# Patient Record
Sex: FEMALE | Race: WHITE | NOT HISPANIC OR LATINO
[De-identification: names, ages, dates, MRNs, and addresses within clinical notes are randomized per-mention and may not be internally consistent; named-entity substitution may affect disease eponyms.]

---

## 2020-09-24 ENCOUNTER — APPOINTMENT (OUTPATIENT)
Dept: GASTROENTEROLOGY | Facility: CLINIC | Age: 75
End: 2020-09-24

## 2021-04-26 ENCOUNTER — APPOINTMENT (OUTPATIENT)
Dept: FAMILY MEDICINE | Facility: CLINIC | Age: 76
End: 2021-04-26
Payer: MEDICARE

## 2021-04-26 PROCEDURE — 99203 OFFICE O/P NEW LOW 30 MIN: CPT

## 2021-04-27 VITALS
SYSTOLIC BLOOD PRESSURE: 130 MMHG | HEIGHT: 60 IN | HEART RATE: 70 BPM | DIASTOLIC BLOOD PRESSURE: 74 MMHG | TEMPERATURE: 97.2 F | WEIGHT: 90 LBS | BODY MASS INDEX: 17.67 KG/M2 | OXYGEN SATURATION: 97 % | RESPIRATION RATE: 18 BRPM

## 2021-04-27 NOTE — REVIEW OF SYSTEMS
[Abdominal Pain] : abdominal pain [Constipation] : constipation [Heartburn] : heartburn [Joint Pain] : joint pain [Joint Stiffness] : joint stiffness [Joint Swelling] : joint swelling [Back Pain] : back pain [Negative] : Heme/Lymph [Chest Pain] : no chest pain [Shortness Of Breath] : no shortness of breath [Nausea] : no nausea [Diarrhea] : diarrhea [Vomiting] : no vomiting [Melena] : no melena [Muscle Weakness] : no muscle weakness [Muscle Pain] : no muscle pain [FreeTextEntry7] : epigastric tenderness, RLQ "discomfort" [FreeTextEntry9] : bilateral hands, worse in the morning, chronic

## 2021-04-27 NOTE — PHYSICAL EXAM
[Kyphosis] : kyphosis [Scoliosis] : scoliosis [Grossly Normal Strength/Tone] : grossly normal strength/tone [Coordination Grossly Intact] : coordination grossly intact [No Focal Deficits] : no focal deficits [Normal Gait] : normal gait [Speech Grossly Normal] : speech grossly normal [Alert and Oriented x3] : oriented to person, place, and time [Normal Mood] : the mood was normal [Normal] : affect was normal and insight and judgment were intact [de-identified] : bilateral joint swelling to fingers

## 2021-04-27 NOTE — HISTORY OF PRESENT ILLNESS
[FreeTextEntry8] : 74 yo female presents to the office to establish care and for RLQ discomfort since september. the patient reports that the "discomfort" is usually worse when she is sitting up, or bending at the waist. she reports usually having difficulty with her bowels, which is normal for her. her last FIT-DNA test was 2015, which was normal, and has never had a visual colonoscopy. She also reports intermittent GERD symptoms, which she also has a history of, which is intermittent, depending on what she ate that day. She states that in the past, if her IBS and GERD become very bad, she usually drinks only liquids for a couple of days, and then slowly starts to reincorporate solid foods that she can tolerate, until she feels better. she denies any chest pain, shortness of breath, or urinary symptoms. she also reports chronic back pain, due to her scoliosis and kyphosis. in addition, she reports bilateral hand stiffness and soreness, and some knuckle swelling, but denies any other joint pain in her body.

## 2021-05-10 ENCOUNTER — NON-APPOINTMENT (OUTPATIENT)
Age: 76
End: 2021-05-10

## 2021-05-10 DIAGNOSIS — R31.29 OTHER MICROSCOPIC HEMATURIA: ICD-10-CM

## 2021-05-28 ENCOUNTER — OUTPATIENT (OUTPATIENT)
Dept: OUTPATIENT SERVICES | Facility: HOSPITAL | Age: 76
LOS: 1 days | End: 2021-05-28
Payer: MEDICARE

## 2021-05-28 ENCOUNTER — APPOINTMENT (OUTPATIENT)
Dept: ULTRASOUND IMAGING | Facility: IMAGING CENTER | Age: 76
End: 2021-05-28
Payer: MEDICARE

## 2021-05-28 DIAGNOSIS — R10.31 RIGHT LOWER QUADRANT PAIN: ICD-10-CM

## 2021-05-28 DIAGNOSIS — K58.9 IRRITABLE BOWEL SYNDROME WITHOUT DIARRHEA: ICD-10-CM

## 2021-05-28 DIAGNOSIS — Z98.89 OTHER SPECIFIED POSTPROCEDURAL STATES: Chronic | ICD-10-CM

## 2021-05-28 PROCEDURE — 76700 US EXAM ABDOM COMPLETE: CPT

## 2021-05-28 PROCEDURE — 76700 US EXAM ABDOM COMPLETE: CPT | Mod: 26

## 2021-06-10 ENCOUNTER — OUTPATIENT (OUTPATIENT)
Dept: OUTPATIENT SERVICES | Facility: HOSPITAL | Age: 76
LOS: 1 days | End: 2021-06-10
Payer: MEDICARE

## 2021-06-10 ENCOUNTER — APPOINTMENT (OUTPATIENT)
Dept: MRI IMAGING | Facility: IMAGING CENTER | Age: 76
End: 2021-06-10
Payer: MEDICARE

## 2021-06-10 DIAGNOSIS — K82.8 OTHER SPECIFIED DISEASES OF GALLBLADDER: ICD-10-CM

## 2021-06-10 DIAGNOSIS — Z98.89 OTHER SPECIFIED POSTPROCEDURAL STATES: Chronic | ICD-10-CM

## 2021-06-10 PROCEDURE — 74183 MRI ABD W/O CNTR FLWD CNTR: CPT

## 2021-06-10 PROCEDURE — A9585: CPT

## 2021-06-10 PROCEDURE — 74183 MRI ABD W/O CNTR FLWD CNTR: CPT | Mod: 26,MH

## 2021-06-16 ENCOUNTER — NON-APPOINTMENT (OUTPATIENT)
Age: 76
End: 2021-06-16

## 2021-07-01 ENCOUNTER — RESULT REVIEW (OUTPATIENT)
Age: 76
End: 2021-07-01

## 2021-07-01 ENCOUNTER — APPOINTMENT (OUTPATIENT)
Dept: GASTROENTEROLOGY | Facility: CLINIC | Age: 76
End: 2021-07-01
Payer: MEDICARE

## 2021-07-01 VITALS
DIASTOLIC BLOOD PRESSURE: 80 MMHG | HEART RATE: 75 BPM | TEMPERATURE: 98.2 F | WEIGHT: 85 LBS | BODY MASS INDEX: 16.69 KG/M2 | HEIGHT: 60 IN | OXYGEN SATURATION: 98 % | SYSTOLIC BLOOD PRESSURE: 128 MMHG

## 2021-07-01 PROCEDURE — 99204 OFFICE O/P NEW MOD 45 MIN: CPT

## 2021-07-01 RX ORDER — FAMOTIDINE 10 MG/1
10 TABLET, FILM COATED ORAL DAILY
Qty: 30 | Refills: 1 | Status: ACTIVE | COMMUNITY
Start: 2021-07-01 | End: 1900-01-01

## 2021-07-01 NOTE — CONSULT LETTER
[Dear  ___] : Dear ~HALINA, [Consult Letter:] : I had the pleasure of evaluating your patient, [unfilled]. [Please see my note below.] : Please see my note below. [Consult Closing:] : Thank you very much for allowing me to participate in the care of this patient.  If you have any questions, please do not hesitate to contact me. [Sincerely,] : Sincerely, [FreeTextEntry3] : Himanshu James MD, MPH, FASGE\par Chief of Clinical Quality in Gastroenterology, [facility]\par Associate Chief of Gastroenterology, [facility]\par Director of Endoscopic Ultrasound, [facility]\par [address], [address]\par [place], [address]\par 24 hours [phone]\par \par

## 2021-07-01 NOTE — PHYSICAL EXAM
[General Appearance - Alert] : alert [General Appearance - In No Acute Distress] : in no acute distress [Sclera] : the sclera and conjunctiva were normal [Auscultation Breath Sounds / Voice Sounds] : lungs were clear to auscultation bilaterally [Heart Rate And Rhythm] : heart rate was normal and rhythm regular [Bowel Sounds] : normal bowel sounds [Abdomen Soft] : soft [] : no hepato-splenomegaly [Abdomen Mass (___ Cm)] : no abdominal mass palpated [RUQ] : in the right upper quadrant [RLQ] : in the right lower quadrant [Abnormal Walk] : normal gait [FreeTextEntry1] : No confusion [Affect] : the affect was normal

## 2021-07-01 NOTE — ASSESSMENT
[FreeTextEntry1] : Impression:\par \par Gallbladder mass with mild RUQ tenderness\par RLQ abd pain\par Heartburn, subacute\par Low weight (85 lb) (was told by GI in 2000's that she had to eat bland food)\par Spasmodic dysphonia \par \par Recommendations:\par \par CT scan abd/pelvis\par Benefiber\par Pepcid\par Eventual referral to surgery\par Followup in 3 months.\par

## 2021-07-01 NOTE — HISTORY OF PRESENT ILLNESS
[FreeTextEntry1] : Patient referred for RLQ abdominal gas/stretching type pain for 2-3 months\par Intermittent\par \par Not affected by bowel movements\par May be affected by food\par No constipation/diarrhea/blood in stool\par No fever, chills, sweats, weight loss.\par \par Heartburn daily x few weeks\par Takes no meds.\par No dysphagia / odynophagia\par \par Labs reviewed from 4/2821\par Normal CBC/LFTs\par Normal Cr 0.85\par \par Ultrasound abdomen 5/28/21:\par 1.3 x 1 cm soft tissue mass in gallbladder fundus\par \par MRI abdomen 6/10/21\par 1.5 x 1.3 cm nodule adjacent to gallbladder fundus with T2 hypointensity and mild peripheral enhancement.\par Indeterminant, differential diagnosis:  adenomyomatosis or neoplasm\par \par \par

## 2021-07-08 ENCOUNTER — OUTPATIENT (OUTPATIENT)
Dept: OUTPATIENT SERVICES | Facility: HOSPITAL | Age: 76
LOS: 1 days | End: 2021-07-08
Payer: MEDICARE

## 2021-07-08 ENCOUNTER — APPOINTMENT (OUTPATIENT)
Dept: CT IMAGING | Facility: IMAGING CENTER | Age: 76
End: 2021-07-08
Payer: MEDICARE

## 2021-07-08 DIAGNOSIS — R10.31 RIGHT LOWER QUADRANT PAIN: ICD-10-CM

## 2021-07-08 DIAGNOSIS — Z98.89 OTHER SPECIFIED POSTPROCEDURAL STATES: Chronic | ICD-10-CM

## 2021-07-08 PROCEDURE — G1004: CPT

## 2021-07-08 PROCEDURE — 74177 CT ABD & PELVIS W/CONTRAST: CPT | Mod: 26,ME

## 2021-07-08 PROCEDURE — 74177 CT ABD & PELVIS W/CONTRAST: CPT

## 2021-07-20 ENCOUNTER — TRANSCRIPTION ENCOUNTER (OUTPATIENT)
Age: 76
End: 2021-07-20

## 2021-08-03 ENCOUNTER — APPOINTMENT (OUTPATIENT)
Dept: SURGICAL ONCOLOGY | Facility: CLINIC | Age: 76
End: 2021-08-03
Payer: MEDICARE

## 2021-08-03 VITALS
DIASTOLIC BLOOD PRESSURE: 93 MMHG | BODY MASS INDEX: 16.49 KG/M2 | OXYGEN SATURATION: 98 % | WEIGHT: 84 LBS | HEIGHT: 60 IN | HEART RATE: 74 BPM | RESPIRATION RATE: 18 BRPM | SYSTOLIC BLOOD PRESSURE: 149 MMHG

## 2021-08-03 PROCEDURE — 99203 OFFICE O/P NEW LOW 30 MIN: CPT

## 2021-08-09 ENCOUNTER — OUTPATIENT (OUTPATIENT)
Dept: OUTPATIENT SERVICES | Facility: HOSPITAL | Age: 76
LOS: 1 days | End: 2021-08-09
Payer: MEDICARE

## 2021-08-09 ENCOUNTER — APPOINTMENT (OUTPATIENT)
Dept: FAMILY MEDICINE | Facility: CLINIC | Age: 76
End: 2021-08-09

## 2021-08-09 VITALS
WEIGHT: 84 LBS | SYSTOLIC BLOOD PRESSURE: 118 MMHG | OXYGEN SATURATION: 98 % | RESPIRATION RATE: 16 BRPM | HEART RATE: 78 BPM | HEIGHT: 59 IN | TEMPERATURE: 98 F | DIASTOLIC BLOOD PRESSURE: 80 MMHG

## 2021-08-09 DIAGNOSIS — R94.31 ABNORMAL ELECTROCARDIOGRAM [ECG] [EKG]: ICD-10-CM

## 2021-08-09 DIAGNOSIS — Z98.89 OTHER SPECIFIED POSTPROCEDURAL STATES: Chronic | ICD-10-CM

## 2021-08-09 DIAGNOSIS — Z90.79 ACQUIRED ABSENCE OF OTHER GENITAL ORGAN(S): Chronic | ICD-10-CM

## 2021-08-09 DIAGNOSIS — Z87.81 PERSONAL HISTORY OF (HEALED) TRAUMATIC FRACTURE: Chronic | ICD-10-CM

## 2021-08-09 DIAGNOSIS — K82.8 OTHER SPECIFIED DISEASES OF GALLBLADDER: ICD-10-CM

## 2021-08-09 DIAGNOSIS — Z91.013 ALLERGY TO SEAFOOD: ICD-10-CM

## 2021-08-09 DIAGNOSIS — Z98.890 OTHER SPECIFIED POSTPROCEDURAL STATES: Chronic | ICD-10-CM

## 2021-08-09 LAB
ALBUMIN SERPL ELPH-MCNC: 4.6 G/DL — SIGNIFICANT CHANGE UP (ref 3.3–5)
ALP SERPL-CCNC: 117 U/L — SIGNIFICANT CHANGE UP (ref 40–120)
ALT FLD-CCNC: 20 U/L — SIGNIFICANT CHANGE UP (ref 4–33)
ANION GAP SERPL CALC-SCNC: 14 MMOL/L — SIGNIFICANT CHANGE UP (ref 7–14)
APTT BLD: 31.6 SEC — SIGNIFICANT CHANGE UP (ref 27–36.3)
AST SERPL-CCNC: 28 U/L — SIGNIFICANT CHANGE UP (ref 4–32)
BILIRUB SERPL-MCNC: 0.4 MG/DL — SIGNIFICANT CHANGE UP (ref 0.2–1.2)
BLD GP AB SCN SERPL QL: NEGATIVE — SIGNIFICANT CHANGE UP
BUN SERPL-MCNC: 23 MG/DL — SIGNIFICANT CHANGE UP (ref 7–23)
CALCIUM SERPL-MCNC: 10.2 MG/DL — SIGNIFICANT CHANGE UP (ref 8.4–10.5)
CHLORIDE SERPL-SCNC: 102 MMOL/L — SIGNIFICANT CHANGE UP (ref 98–107)
CO2 SERPL-SCNC: 24 MMOL/L — SIGNIFICANT CHANGE UP (ref 22–31)
CREAT SERPL-MCNC: 0.85 MG/DL — SIGNIFICANT CHANGE UP (ref 0.5–1.3)
GLUCOSE SERPL-MCNC: 108 MG/DL — HIGH (ref 70–99)
HCT VFR BLD CALC: 43.3 % — SIGNIFICANT CHANGE UP (ref 34.5–45)
HGB BLD-MCNC: 13.9 G/DL — SIGNIFICANT CHANGE UP (ref 11.5–15.5)
INR BLD: 1.02 RATIO — SIGNIFICANT CHANGE UP (ref 0.88–1.16)
MCHC RBC-ENTMCNC: 29.3 PG — SIGNIFICANT CHANGE UP (ref 27–34)
MCHC RBC-ENTMCNC: 32.1 GM/DL — SIGNIFICANT CHANGE UP (ref 32–36)
MCV RBC AUTO: 91.4 FL — SIGNIFICANT CHANGE UP (ref 80–100)
NRBC # BLD: 0 /100 WBCS — SIGNIFICANT CHANGE UP
NRBC # FLD: 0 K/UL — SIGNIFICANT CHANGE UP
PLATELET # BLD AUTO: 191 K/UL — SIGNIFICANT CHANGE UP (ref 150–400)
POTASSIUM SERPL-MCNC: 3.6 MMOL/L — SIGNIFICANT CHANGE UP (ref 3.5–5.3)
POTASSIUM SERPL-SCNC: 3.6 MMOL/L — SIGNIFICANT CHANGE UP (ref 3.5–5.3)
PROT SERPL-MCNC: 7.6 G/DL — SIGNIFICANT CHANGE UP (ref 6–8.3)
PROTHROM AB SERPL-ACNC: 11.6 SEC — SIGNIFICANT CHANGE UP (ref 10.6–13.6)
RBC # BLD: 4.74 M/UL — SIGNIFICANT CHANGE UP (ref 3.8–5.2)
RBC # FLD: 13 % — SIGNIFICANT CHANGE UP (ref 10.3–14.5)
RH IG SCN BLD-IMP: POSITIVE — SIGNIFICANT CHANGE UP
SODIUM SERPL-SCNC: 140 MMOL/L — SIGNIFICANT CHANGE UP (ref 135–145)
WBC # BLD: 6.72 K/UL — SIGNIFICANT CHANGE UP (ref 3.8–10.5)
WBC # FLD AUTO: 6.72 K/UL — SIGNIFICANT CHANGE UP (ref 3.8–10.5)

## 2021-08-09 PROCEDURE — 93010 ELECTROCARDIOGRAM REPORT: CPT

## 2021-08-09 RX ORDER — SODIUM CHLORIDE 9 MG/ML
3 INJECTION INTRAMUSCULAR; INTRAVENOUS; SUBCUTANEOUS EVERY 8 HOURS
Refills: 0 | Status: DISCONTINUED | OUTPATIENT
Start: 2021-08-20 | End: 2021-08-22

## 2021-08-09 RX ORDER — SODIUM CHLORIDE 9 MG/ML
1000 INJECTION, SOLUTION INTRAVENOUS
Refills: 0 | Status: DISCONTINUED | OUTPATIENT
Start: 2021-08-20 | End: 2021-08-22

## 2021-08-09 NOTE — H&P PST ADULT - NEGATIVE GENERAL GENITOURINARY SYMPTOMS
no hematuria/no renal colic/no flank pain L/no flank pain R/no urine discoloration/no bladder infections/no dysuria

## 2021-08-09 NOTE — H&P PST ADULT - HISTORY OF PRESENT ILLNESS
stretching, swelling occasional RLQ prickly pain 04/2021. S/P US which revealed a mass  76 y/o female presents to PST with c/o "stretching, swelling occasional RLQ prickly pain 04/2021". S/P US (05.2021)/ MRI (06/2021) which revealed "a mass". Scheduled for robotic cholecystectomy possible liver resection possible open on 08/20/2021

## 2021-08-09 NOTE — H&P PST ADULT - NSICDXPROBLEM_GEN_ALL_CORE_FT
PROBLEM DIAGNOSES  Problem: Other specified diseases of gallbladder  Assessment and Plan: Scheduled for robotic cholecystectomy possible liver resection possible open on 08/20/2021. Pre op instructions, famotidine, chlorhexidine gluconate soap given and explained. Pt verbalized understanding.   Pt instructed to contact surgeon's office regarding Covid 19 test pre op. Pt verbalized understanding.     Problem: Shellfish allergy  Assessment and Plan: H/O shellfish allergy       PROBLEM DIAGNOSES  Problem: Other specified diseases of gallbladder  Assessment and Plan: Scheduled for robotic cholecystectomy possible liver resection possible open on 08/20/2021. Pre op instructions, famotidine, chlorhexidine gluconate soap given and explained. Pt verbalized understanding.   Pt instructed to contact surgeon's office regarding Covid 19 test pre op. Pt verbalized understanding.     Problem: Shellfish allergy  Assessment and Plan: H/O shellfish allergy    Problem: Abnormal EKG  Assessment and Plan: Pt instructed to see [facility]P for further consultation. Pt stated that she has scheduled appt with PCP on 08/13/2021  Pending medical consultation pre op

## 2021-08-09 NOTE — H&P PST ADULT - NEGATIVE OPHTHALMOLOGIC SYMPTOMS
no diplopia/no photophobia/no lacrimation L/no lacrimation R/no blurred vision L/no discharge L/no discharge R/no pain L/no irritation L/no irritation R/no loss of vision L/no loss of vision R

## 2021-08-09 NOTE — H&P PST ADULT - GASTROINTESTINAL DETAILS
soft/nontender/no distention/no masses palpable/bowel sounds normal soft/nontender/no distention/bowel sounds normal

## 2021-08-09 NOTE — H&P PST ADULT - NEGATIVE ENMT SYMPTOMS
no hearing difficulty/no ear pain/no tinnitus/no vertigo/no post-nasal discharge/no abnormal taste sensation

## 2021-08-09 NOTE — H&P PST ADULT - NSICDXPASTSURGICALHX_GEN_ALL_CORE_FT
PAST SURGICAL HISTORY:  History of appendectomy      PAST SURGICAL HISTORY:  H/O removal of cyst left axilla    H/O unilateral salpingectomy right    History of appendectomy left; repair    History of fracture of humerus

## 2021-08-09 NOTE — H&P PST ADULT - NSICDXPASTMEDICALHX_GEN_ALL_CORE_FT
PAST MEDICAL HISTORY:  Arthritis      PAST MEDICAL HISTORY:  Acid reflux     Arthritis     Spasmodic dysphonia      PAST MEDICAL HISTORY:  Acid reflux     Arthritis     Hiatal hernia     IBS (irritable bowel syndrome)     Scoliosis     Spasmodic dysphonia      PAST MEDICAL HISTORY:  Acid reflux     Arthritis     Hiatal hernia     IBS (irritable bowel syndrome)     MVP (mitral valve prolapse)     Scoliosis     Spasmodic dysphonia

## 2021-08-13 ENCOUNTER — APPOINTMENT (OUTPATIENT)
Dept: FAMILY MEDICINE | Facility: CLINIC | Age: 76
End: 2021-08-13
Payer: MEDICARE

## 2021-08-13 ENCOUNTER — NON-APPOINTMENT (OUTPATIENT)
Age: 76
End: 2021-08-13

## 2021-08-13 VITALS
WEIGHT: 84 LBS | TEMPERATURE: 99.8 F | DIASTOLIC BLOOD PRESSURE: 72 MMHG | BODY MASS INDEX: 14.88 KG/M2 | SYSTOLIC BLOOD PRESSURE: 140 MMHG | RESPIRATION RATE: 18 BRPM | HEIGHT: 63 IN | HEART RATE: 73 BPM | OXYGEN SATURATION: 97 %

## 2021-08-13 DIAGNOSIS — Z01.818 ENCOUNTER FOR OTHER PREPROCEDURAL EXAMINATION: ICD-10-CM

## 2021-08-13 PROCEDURE — 99215 OFFICE O/P EST HI 40 MIN: CPT | Mod: 25

## 2021-08-13 PROCEDURE — 93000 ELECTROCARDIOGRAM COMPLETE: CPT

## 2021-08-16 DIAGNOSIS — Z01.818 ENCOUNTER FOR OTHER PREPROCEDURAL EXAMINATION: ICD-10-CM

## 2021-08-17 ENCOUNTER — APPOINTMENT (OUTPATIENT)
Dept: DISASTER EMERGENCY | Facility: CLINIC | Age: 76
End: 2021-08-17

## 2021-08-18 PROBLEM — Z01.818 PRE-OPERATIVE EXAMINATION: Status: ACTIVE | Noted: 2021-08-13

## 2021-08-18 LAB — SARS-COV-2 N GENE NPH QL NAA+PROBE: NOT DETECTED

## 2021-08-18 NOTE — ASSESSMENT
[Patient Optimized for Surgery] : Patient optimized for surgery [No Further Testing Recommended] : no further testing recommended [As per surgery] : as per surgery [FreeTextEntry4] : Patient optimized for surgery\par - Pending COVID PCR nasal swab results [FreeTextEntry2] : Patient instructed to d/c all OTC medications/supplements one week prior to surgery

## 2021-08-18 NOTE — PHYSICAL EXAM
[Declined Breast Exam] : declined breast exam  [Declined Rectal Exam] : declined rectal exam [Normal Supraclavicular Nodes] : no supraclavicular lymphadenopathy [No CVA Tenderness] : no CVA  tenderness [No Spinal Tenderness] : no spinal tenderness [Kyphosis] : kyphosis [Scoliosis] : scoliosis [Coordination Grossly Intact] : coordination grossly intact [No Focal Deficits] : no focal deficits [Normal Gait] : normal gait [Speech Grossly Normal] : speech grossly normal [Alert and Oriented x3] : oriented to person, place, and time [Normal Mood] : the mood was normal [Normal] : affect was normal and insight and judgment were intact

## 2021-08-18 NOTE — HISTORY OF PRESENT ILLNESS
[No Pertinent Cardiac History] : no history of aortic stenosis, atrial fibrillation, coronary artery disease, recent myocardial infarction, or implantable device/pacemaker [No Pertinent Pulmonary History] : no history of asthma, COPD, sleep apnea, or smoking [No Adverse Anesthesia Reaction] : no adverse anesthesia reaction in self or family member [(Patient denies any chest pain, claudication, dyspnea on exertion, orthopnea, palpitations or syncope)] : Patient denies any chest pain, claudication, dyspnea on exertion, orthopnea, palpitations or syncope [Chronic Anticoagulation] : no chronic anticoagulation [Chronic Kidney Disease] : no chronic kidney disease [Diabetes] : no diabetes [FreeTextEntry1] : Cholecystectomy for gall bladder mass, possible liver resection [FreeTextEntry2] : 08/20/21 [FreeTextEntry3] : MD Crow Best [FreeTextEntry4] : 74 yo female presents to the office for pre operative clearance for a cholecystectomy for a gall bladder mass. [FreeTextEntry7] : EKG done: sinus rhythm @ 67 bpm, no acute changes

## 2021-08-18 NOTE — REVIEW OF SYSTEMS
[Joint Pain] : joint pain [Back Pain] : back pain [Negative] : Heme/Lymph [Nausea] : no nausea [Constipation] : no constipation [Diarrhea] : diarrhea [Vomiting] : no vomiting [Heartburn] : no heartburn [Melena] : no melena [Joint Stiffness] : no joint stiffness [Joint Swelling] : no joint swelling [Muscle Weakness] : no muscle weakness [Muscle Pain] : no muscle pain [FreeTextEntry7] : 'abdominal discomfort ruq' [FreeTextEntry9] : chronic

## 2021-08-19 ENCOUNTER — TRANSCRIPTION ENCOUNTER (OUTPATIENT)
Age: 76
End: 2021-08-19

## 2021-08-19 NOTE — REASON FOR VISIT
[Initial Consultation] : an initial consultation for [Referred By: ___] : Referred By: [unfilled] [FreeTextEntry2] : gallbladder mass

## 2021-08-19 NOTE — HISTORY OF PRESENT ILLNESS
[de-identified] : Ms. Delgado is a 74 y/o female who presented with 2-3 month history of RUQ abdominal pain with associated acid reflux.\par She denies fever/chills, nausea/emesis or changes in bowel habits.\par Abdominal ultrasound 05/28/2021 demonstrated a 1.3 x 1 cm soft tissue mass in the gallbladder fundus.\par MRI abdomen 06/10/2021 confirmed 1.5 x 1.3 cm nodule adjacent to the gallbladder fundus.\par CT abdomen/pelvis 07/08/2021 showed a difficult to delineate soft tissue density adjacent to the gallbladder measuring about 1 cm.\par She is referred for evaluation and treatment.

## 2021-08-19 NOTE — ASSESSMENT
[FreeTextEntry1] : 74 y/o female with gallbladder mass or liver mass adjacent to gallbladder.\par Malignancy is not excluded.\par Recommend minimally invasive surgical exploration with expected cholecystectomy possible liver resection.\par All questions answered; risks/benefits explained.  Patient expresses understanding and wishes to proceed.

## 2021-08-20 ENCOUNTER — APPOINTMENT (OUTPATIENT)
Dept: SURGICAL ONCOLOGY | Facility: HOSPITAL | Age: 76
End: 2021-08-20

## 2021-08-20 ENCOUNTER — INPATIENT (INPATIENT)
Facility: HOSPITAL | Age: 76
LOS: 2 days | Discharge: HOME | End: 2021-08-23
Attending: SURGERY | Admitting: SURGERY
Payer: MEDICARE

## 2021-08-20 ENCOUNTER — RESULT REVIEW (OUTPATIENT)
Age: 76
End: 2021-08-20

## 2021-08-20 VITALS
OXYGEN SATURATION: 100 % | HEIGHT: 59 IN | WEIGHT: 84 LBS | SYSTOLIC BLOOD PRESSURE: 143 MMHG | DIASTOLIC BLOOD PRESSURE: 77 MMHG | RESPIRATION RATE: 16 BRPM | HEART RATE: 71 BPM | TEMPERATURE: 99 F

## 2021-08-20 DIAGNOSIS — Z98.890 OTHER SPECIFIED POSTPROCEDURAL STATES: Chronic | ICD-10-CM

## 2021-08-20 DIAGNOSIS — Z98.89 OTHER SPECIFIED POSTPROCEDURAL STATES: Chronic | ICD-10-CM

## 2021-08-20 DIAGNOSIS — K82.8 OTHER SPECIFIED DISEASES OF GALLBLADDER: ICD-10-CM

## 2021-08-20 DIAGNOSIS — Z87.81 PERSONAL HISTORY OF (HEALED) TRAUMATIC FRACTURE: Chronic | ICD-10-CM

## 2021-08-20 DIAGNOSIS — Z90.79 ACQUIRED ABSENCE OF OTHER GENITAL ORGAN(S): Chronic | ICD-10-CM

## 2021-08-20 PROCEDURE — S2900 ROBOTIC SURGICAL SYSTEM: CPT | Mod: NC

## 2021-08-20 PROCEDURE — 47562 LAPAROSCOPIC CHOLECYSTECTOMY: CPT

## 2021-08-20 PROCEDURE — 88305 TISSUE EXAM BY PATHOLOGIST: CPT | Mod: 26

## 2021-08-20 PROCEDURE — 88304 TISSUE EXAM BY PATHOLOGIST: CPT | Mod: 26

## 2021-08-20 PROCEDURE — 44604 SUTURE LARGE INTESTINE: CPT | Mod: 59

## 2021-08-20 PROCEDURE — 49905 OMENTAL FLAP INTRA-ABDOM: CPT

## 2021-08-20 RX ORDER — ENOXAPARIN SODIUM 100 MG/ML
30 INJECTION SUBCUTANEOUS EVERY 24 HOURS
Refills: 0 | Status: DISCONTINUED | OUTPATIENT
Start: 2021-08-21 | End: 2021-08-23

## 2021-08-20 RX ORDER — HYDROMORPHONE HYDROCHLORIDE 2 MG/ML
0.25 INJECTION INTRAMUSCULAR; INTRAVENOUS; SUBCUTANEOUS
Refills: 0 | Status: DISCONTINUED | OUTPATIENT
Start: 2021-08-20 | End: 2021-08-20

## 2021-08-20 RX ORDER — FAMOTIDINE 10 MG/ML
20 INJECTION INTRAVENOUS
Refills: 0 | Status: DISCONTINUED | OUTPATIENT
Start: 2021-08-20 | End: 2021-08-23

## 2021-08-20 RX ORDER — ALUMINUM HYDROXIDE AND MAGNESIUM TRISILICATE 80; 14.2 MG/1; MG/1
2 TABLET, CHEWABLE ORAL
Qty: 0 | Refills: 0 | DISCHARGE

## 2021-08-20 RX ORDER — METRONIDAZOLE 7.5 MG/G
0 GEL VAGINAL
Qty: 0 | Refills: 3 | DISCHARGE

## 2021-08-20 RX ORDER — CHOLECALCIFEROL (VITAMIN D3) 125 MCG
1 CAPSULE ORAL
Qty: 0 | Refills: 0 | DISCHARGE

## 2021-08-20 RX ORDER — ONDANSETRON 8 MG/1
4 TABLET, FILM COATED ORAL ONCE
Refills: 0 | Status: COMPLETED | OUTPATIENT
Start: 2021-08-20 | End: 2021-08-20

## 2021-08-20 RX ORDER — CEFOTETAN DISODIUM 1 G
2 VIAL (EA) INJECTION EVERY 12 HOURS
Refills: 0 | Status: COMPLETED | OUTPATIENT
Start: 2021-08-20 | End: 2021-08-21

## 2021-08-20 RX ORDER — ACETAMINOPHEN 500 MG
500 TABLET ORAL EVERY 6 HOURS
Refills: 0 | Status: COMPLETED | OUTPATIENT
Start: 2021-08-20 | End: 2021-08-21

## 2021-08-20 RX ORDER — CHOLECALCIFEROL (VITAMIN D3) 125 MCG
1000 CAPSULE ORAL DAILY
Refills: 0 | Status: DISCONTINUED | OUTPATIENT
Start: 2021-08-20 | End: 2021-08-23

## 2021-08-20 RX ORDER — OXYCODONE HYDROCHLORIDE 5 MG/1
5 TABLET ORAL EVERY 4 HOURS
Refills: 0 | Status: DISCONTINUED | OUTPATIENT
Start: 2021-08-20 | End: 2021-08-23

## 2021-08-20 RX ORDER — OXYCODONE HYDROCHLORIDE 5 MG/1
2.5 TABLET ORAL EVERY 4 HOURS
Refills: 0 | Status: DISCONTINUED | OUTPATIENT
Start: 2021-08-20 | End: 2021-08-23

## 2021-08-20 RX ADMIN — Medication 200 MILLIGRAM(S): at 20:00

## 2021-08-20 RX ADMIN — Medication 500 MILLIGRAM(S): at 20:30

## 2021-08-20 RX ADMIN — ONDANSETRON 4 MILLIGRAM(S): 8 TABLET, FILM COATED ORAL at 16:30

## 2021-08-20 RX ADMIN — SODIUM CHLORIDE 3 MILLILITER(S): 9 INJECTION INTRAMUSCULAR; INTRAVENOUS; SUBCUTANEOUS at 21:44

## 2021-08-20 RX ADMIN — SODIUM CHLORIDE 50 MILLILITER(S): 9 INJECTION, SOLUTION INTRAVENOUS at 16:00

## 2021-08-20 RX ADMIN — Medication 100 GRAM(S): at 20:01

## 2021-08-20 NOTE — ASU PREOP CHECKLIST - AS BP NONINV SITE
normal appearance , without tenderness upon palpation , no deformities , trachea midline  left upper arm

## 2021-08-20 NOTE — BRIEF OPERATIVE NOTE - NSICDXBRIEFPROCEDURE_GEN_ALL_CORE_FT
PROCEDURES:  Robot-assisted laparoscopic cholecystectomy 20-Aug-2021 15:34:12  Sanket Best  Repair of colotomy 20-Aug-2021 15:34:23  Sanket Best

## 2021-08-20 NOTE — CHART NOTE - NSCHARTNOTEFT_GEN_A_CORE
Surgery Post-Op Note    Pre-Op Dx: Acute cholecystitis   Procedure: Robot-assisted laparoscopic cholecystectomy  Repair of colotomy  Surgeon: Dr. Best    SUBJECTIVE:  Pt seen and examined at the bedside. Pt w/ no complaints. Denies F/C/N/V. Pain controlled with medication.     OBJECTIVE:  Vital Signs Last 24 Hrs  T(C): 36.2 (20 Aug 2021 19:59), Max: 37.1 (20 Aug 2021 11:22)  T(F): 97.1 (20 Aug 2021 19:59), Max: 98.7 (20 Aug 2021 11:22)  HR: 66 (20 Aug 2021 19:59) (66 - 82)  BP: 118/66 (20 Aug 2021 19:59) (111/65 - 143/77)  BP(mean): 86 (20 Aug 2021 18:00) (76 - 91)  RR: 17 (20 Aug 2021 19:59) (13 - 19)  SpO2: 100% (20 Aug 2021 19:59) (97% - 100%)    Physical Exam:  General: NAD, resting comfortably in bed  Neuro: A/O x 3, no focal deficits  Pulmonary: Nonlabored breathing, no respiratory distress  Cardiovascular: NSR  Abdominal: soft, ATTP. mildly distended. Dressing C/D/I  Extremities: WWP    LABS:            CAPILLARY BLOOD GLUCOSE            ABO Interpretation: O (08-20 @ 11:42)      IMAGING:    ASSESSMENT:75y Female now 4hours s/p robotic assisted laparoscopic cholecystectomy with transverse colon repair.     PLAN:  - Pain control  - Encourage IS  - Nausea control PRN  - Monitor vitals  - Diet: IVF + NPO  - Monitor I+Os  - OOB/ Ambulate  - DVT ppx: lovenox    D Team Surgery  [id number]

## 2021-08-20 NOTE — BRIEF OPERATIVE NOTE - OPERATION/FINDINGS
Robotic assisted laparoscopic cholecystectomy. Gallbladder mass identified, cholecystectomy performed. Frozen section consistent with gallbladder adenomyosis. During abdominal entry the transverse colon was injured and subsequently repaired.

## 2021-08-21 LAB
ALBUMIN SERPL ELPH-MCNC: 3.3 G/DL — SIGNIFICANT CHANGE UP (ref 3.3–5)
ALP SERPL-CCNC: 85 U/L — SIGNIFICANT CHANGE UP (ref 40–120)
ALT FLD-CCNC: 13 U/L — SIGNIFICANT CHANGE UP (ref 4–33)
ANION GAP SERPL CALC-SCNC: 16 MMOL/L — HIGH (ref 7–14)
AST SERPL-CCNC: 25 U/L — SIGNIFICANT CHANGE UP (ref 4–32)
BILIRUB DIRECT SERPL-MCNC: <0.2 MG/DL — SIGNIFICANT CHANGE UP (ref 0–0.2)
BILIRUB INDIRECT FLD-MCNC: >0.3 MG/DL — SIGNIFICANT CHANGE UP (ref 0–1)
BILIRUB SERPL-MCNC: 0.5 MG/DL — SIGNIFICANT CHANGE UP (ref 0.2–1.2)
BUN SERPL-MCNC: 16 MG/DL — SIGNIFICANT CHANGE UP (ref 7–23)
CALCIUM SERPL-MCNC: 9.2 MG/DL — SIGNIFICANT CHANGE UP (ref 8.4–10.5)
CHLORIDE SERPL-SCNC: 102 MMOL/L — SIGNIFICANT CHANGE UP (ref 98–107)
CO2 SERPL-SCNC: 21 MMOL/L — LOW (ref 22–31)
COVID-19 SPIKE DOMAIN AB INTERP: POSITIVE
COVID-19 SPIKE DOMAIN ANTIBODY RESULT: >250 U/ML — HIGH
CREAT SERPL-MCNC: 0.86 MG/DL — SIGNIFICANT CHANGE UP (ref 0.5–1.3)
GLUCOSE SERPL-MCNC: 110 MG/DL — HIGH (ref 70–99)
HCT VFR BLD CALC: 36.5 % — SIGNIFICANT CHANGE UP (ref 34.5–45)
HGB BLD-MCNC: 11.9 G/DL — SIGNIFICANT CHANGE UP (ref 11.5–15.5)
MAGNESIUM SERPL-MCNC: 1.9 MG/DL — SIGNIFICANT CHANGE UP (ref 1.6–2.6)
MCHC RBC-ENTMCNC: 29.1 PG — SIGNIFICANT CHANGE UP (ref 27–34)
MCHC RBC-ENTMCNC: 32.6 GM/DL — SIGNIFICANT CHANGE UP (ref 32–36)
MCV RBC AUTO: 89.2 FL — SIGNIFICANT CHANGE UP (ref 80–100)
NRBC # BLD: 0 /100 WBCS — SIGNIFICANT CHANGE UP
NRBC # FLD: 0 K/UL — SIGNIFICANT CHANGE UP
PHOSPHATE SERPL-MCNC: 4.2 MG/DL — SIGNIFICANT CHANGE UP (ref 2.5–4.5)
PLATELET # BLD AUTO: 160 K/UL — SIGNIFICANT CHANGE UP (ref 150–400)
POTASSIUM SERPL-MCNC: 4.1 MMOL/L — SIGNIFICANT CHANGE UP (ref 3.5–5.3)
POTASSIUM SERPL-SCNC: 4.1 MMOL/L — SIGNIFICANT CHANGE UP (ref 3.5–5.3)
PROT SERPL-MCNC: 5.7 G/DL — LOW (ref 6–8.3)
RBC # BLD: 4.09 M/UL — SIGNIFICANT CHANGE UP (ref 3.8–5.2)
RBC # FLD: 13.2 % — SIGNIFICANT CHANGE UP (ref 10.3–14.5)
SARS-COV-2 IGG+IGM SERPL QL IA: >250 U/ML — HIGH
SARS-COV-2 IGG+IGM SERPL QL IA: POSITIVE
SODIUM SERPL-SCNC: 139 MMOL/L — SIGNIFICANT CHANGE UP (ref 135–145)
WBC # BLD: 14.1 K/UL — HIGH (ref 3.8–10.5)
WBC # FLD AUTO: 14.1 K/UL — HIGH (ref 3.8–10.5)

## 2021-08-21 RX ORDER — KETOROLAC TROMETHAMINE 30 MG/ML
15 SYRINGE (ML) INJECTION EVERY 12 HOURS
Refills: 0 | Status: DISCONTINUED | OUTPATIENT
Start: 2021-08-21 | End: 2021-08-23

## 2021-08-21 RX ORDER — KETOROLAC TROMETHAMINE 30 MG/ML
15 SYRINGE (ML) INJECTION ONCE
Refills: 0 | Status: DISCONTINUED | OUTPATIENT
Start: 2021-08-21 | End: 2021-08-21

## 2021-08-21 RX ORDER — MAGNESIUM SULFATE 500 MG/ML
2 VIAL (ML) INJECTION ONCE
Refills: 0 | Status: COMPLETED | OUTPATIENT
Start: 2021-08-21 | End: 2021-08-21

## 2021-08-21 RX ADMIN — Medication 500 MILLIGRAM(S): at 09:25

## 2021-08-21 RX ADMIN — SODIUM CHLORIDE 3 MILLILITER(S): 9 INJECTION INTRAMUSCULAR; INTRAVENOUS; SUBCUTANEOUS at 13:12

## 2021-08-21 RX ADMIN — SODIUM CHLORIDE 50 MILLILITER(S): 9 INJECTION, SOLUTION INTRAVENOUS at 05:21

## 2021-08-21 RX ADMIN — OXYCODONE HYDROCHLORIDE 5 MILLIGRAM(S): 5 TABLET ORAL at 21:11

## 2021-08-21 RX ADMIN — SODIUM CHLORIDE 50 MILLILITER(S): 9 INJECTION, SOLUTION INTRAVENOUS at 21:06

## 2021-08-21 RX ADMIN — Medication 100 GRAM(S): at 09:02

## 2021-08-21 RX ADMIN — Medication 500 MILLIGRAM(S): at 16:11

## 2021-08-21 RX ADMIN — Medication 15 MILLIGRAM(S): at 14:14

## 2021-08-21 RX ADMIN — Medication 15 MILLIGRAM(S): at 13:07

## 2021-08-21 RX ADMIN — OXYCODONE HYDROCHLORIDE 5 MILLIGRAM(S): 5 TABLET ORAL at 22:11

## 2021-08-21 RX ADMIN — Medication 200 MILLIGRAM(S): at 15:42

## 2021-08-21 RX ADMIN — SODIUM CHLORIDE 3 MILLILITER(S): 9 INJECTION INTRAMUSCULAR; INTRAVENOUS; SUBCUTANEOUS at 21:08

## 2021-08-21 RX ADMIN — Medication 1000 UNIT(S): at 13:07

## 2021-08-21 RX ADMIN — Medication 200 MILLIGRAM(S): at 02:30

## 2021-08-21 RX ADMIN — Medication 200 MILLIGRAM(S): at 08:56

## 2021-08-21 RX ADMIN — SODIUM CHLORIDE 3 MILLILITER(S): 9 INJECTION INTRAMUSCULAR; INTRAVENOUS; SUBCUTANEOUS at 05:20

## 2021-08-21 NOTE — PHYSICAL THERAPY INITIAL EVALUATION ADULT - PERTINENT HX OF CURRENT PROBLEM, REHAB EVAL
Pt. is a 75 year old female s/p Robot-assisted laparoscopic cholecystectomy; Repair of colotomy. PMH: scoliosis, IBS, MVP.

## 2021-08-21 NOTE — PHYSICAL THERAPY INITIAL EVALUATION ADULT - ADDITIONAL COMMENTS
pt. has 1 flight of steps at home to negotiate with unilateral railing.     Pt. left seated in chair with all tubes/lines intact, call bell in reach and in NAD. RN aware

## 2021-08-21 NOTE — PHYSICAL THERAPY INITIAL EVALUATION ADULT - DISCHARGE DISPOSITION, PT EVAL
anticipated discharge to home with no skilled restorative physical therapy services upon discharge from [facility]. Please follow therapy for continued assessment.

## 2021-08-21 NOTE — PROGRESS NOTE ADULT - SUBJECTIVE AND OBJECTIVE BOX
D TEAM Surgery Progress Note    INTERVAL EVENTS:   - No acute events overnight.    SUBJECTIVE: Patient seen and examined at bedside with surgical team,     OBJECTIVE:    Vital Signs Last 24 Hrs  T(C): 36.7 (21 Aug 2021 00:10), Max: 37.1 (20 Aug 2021 11:22)  T(F): 98.1 (21 Aug 2021 00:10), Max: 98.7 (20 Aug 2021 11:22)  HR: 61 (21 Aug 2021 00:10) (61 - 82)  BP: 117/58 (21 Aug 2021 00:10) (111/65 - 143/77)  BP(mean): 86 (20 Aug 2021 18:00) (76 - 91)  RR: 16 (21 Aug 2021 00:10) (13 - 19)  SpO2: 98% (21 Aug 2021 00:10) (97% - 100%)I&O's Detail    20 Aug 2021 07:01  -  21 Aug 2021 00:28  --------------------------------------------------------  IN:    Lactated Ringers: 450 mL    Oral Fluid: 120 mL  Total IN: 570 mL    OUT:    Voided (mL): 750 mL  Total OUT: 750 mL    Total NET: -180 mL      MEDICATIONS  (STANDING):  acetaminophen  IVPB .. 500 milliGRAM(s) IV Intermittent every 6 hours  cefoTEtan  IVPB 2 Gram(s) IV Intermittent every 12 hours  cholecalciferol 1000 Unit(s) Oral daily  enoxaparin Injectable 30 milliGRAM(s) SubCutaneous every 24 hours  lactated ringers. 1000 milliLiter(s) (50 mL/Hr) IV Continuous <Continuous>  sodium chloride 0.9% lock flush 3 milliLiter(s) IV Push every 8 hours    MEDICATIONS  (PRN):  aluminum hydroxide/magnesium hydroxide/simethicone Suspension 30 milliLiter(s) Oral every 4 hours PRN Dyspepsia  famotidine    Tablet 20 milliGRAM(s) Oral two times a day PRN reflux  oxyCODONE    IR 2.5 milliGRAM(s) Oral every 4 hours PRN Moderate Pain (4 - 6)  oxyCODONE    IR 5 milliGRAM(s) Oral every 4 hours PRN Severe Pain (7 - 10)      PHYSICAL EXAM:  Constitutional: A&Ox3, NAD  Respiratory: Unlabored breathing  Abdomen: Soft, nondistended, NTTP. No rebound or guarding.  Extremities: WWP, WU spontaneously    LABS:                ABO Interpretation: O (08-20-21 @ 11:42)      IMAGING:     D TEAM Surgery Progress Note    INTERVAL EVENTS:   - No acute events overnight.    SUBJECTIVE: Patient seen and examined at bedside with surgical team, patient refers is pain is moderate before medication and stays mild after tylenol. Patient says she prefers not to take narcotics. Patient has not pass gas or had BM yet. Denies nausea or vomiting.   Tolerating clear diet    OBJECTIVE:    Vital Signs Last 24 Hrs  T(C): 36.2 (21 Aug 2021 09:44), Max: 37.1 (20 Aug 2021 11:22)  T(F): 97.2 (21 Aug 2021 09:44), Max: 98.7 (20 Aug 2021 11:22)  HR: 66 (21 Aug 2021 09:44) (61 - 82)  BP: 98/56 (21 Aug 2021 09:44) (98/56 - 143/77)  BP(mean): 86 (20 Aug 2021 18:00) (76 - 91)  RR: 17 (21 Aug 2021 09:44) (13 - 19)  SpO2: 100% (21 Aug 2021 09:44) (97% - 100%)  --------------------------------------------------------    I&O's Detail    20 Aug 2021 07:01  -  21 Aug 2021 07:00  --------------------------------------------------------  IN:    Lactated Ringers: 750 mL    Oral Fluid: 120 mL  Total IN: 870 mL    OUT:    Voided (mL): 750 mL  Total OUT: 750 mL    Total NET: 120 mL      21 Aug 2021 07:01  -  21 Aug 2021 11:00  --------------------------------------------------------  IN:    IV PiggyBack: 100 mL    Lactated Ringers: 200 mL    Oral Fluid: 100 mL  Total IN: 400 mL    OUT:    Voided (mL): 150 mL  Total OUT: 150 mL    Total NET: 250 mL      MEDICATIONS  (STANDING):  acetaminophen  IVPB .. 500 milliGRAM(s) IV Intermittent every 6 hours  cefoTEtan  IVPB 2 Gram(s) IV Intermittent every 12 hours  cholecalciferol 1000 Unit(s) Oral daily  enoxaparin Injectable 30 milliGRAM(s) SubCutaneous every 24 hours  lactated ringers. 1000 milliLiter(s) (50 mL/Hr) IV Continuous <Continuous>  sodium chloride 0.9% lock flush 3 milliLiter(s) IV Push every 8 hours    MEDICATIONS  (PRN):  aluminum hydroxide/magnesium hydroxide/simethicone Suspension 30 milliLiter(s) Oral every 4 hours PRN Dyspepsia  famotidine    Tablet 20 milliGRAM(s) Oral two times a day PRN reflux  oxyCODONE    IR 2.5 milliGRAM(s) Oral every 4 hours PRN Moderate Pain (4 - 6)  oxyCODONE    IR 5 milliGRAM(s) Oral every 4 hours PRN Severe Pain (7 - 10)      PHYSICAL EXAM:  Constitutional: A&Ox3, NAD  Respiratory: Unlabored breathing  Abdomen: Soft, nondistended, NTTP. No rebound or guarding. New dressings in place.   Extremities: WWP, WU spontaneously    LABS:                        11.9   14.10 )-----------( 160      ( 21 Aug 2021 06:42 )             36.5     08-21    139  |  102  |  16  ----------------------------<  110<H>  4.1   |  21<L>  |  0.86    Ca    9.2      21 Aug 2021 06:42  Phos  4.2     08-21  Mg     1.90     08-21    TPro  5.7<L>  /  Alb  3.3  /  TBili  0.5  /  DBili  <0.2  /  AST  25  /  ALT  13  /  AlkPhos  85  08-21    ABO Interpretation: O (08-20-21 @ 11:42)      IMAGING:

## 2021-08-21 NOTE — PROGRESS NOTE ADULT - ASSESSMENT
75F s/p robotic assisted lap cholecystectomy with transverse colon injury and repair 8/20.    Plan:          D team surgery  #[id number]  75F s/p robotic assisted lap cholecystectomy with transverse colon injury and repair 8/20.    Plan:    - Add Toradol 15 mg BID  - OOB  - Continue clear diet.   - serial abd examinations.         D team surgery  #[id number]

## 2021-08-21 NOTE — PHYSICAL THERAPY INITIAL EVALUATION ADULT - LEVEL OF INDEPENDENCE: STAIR NEGOTIATION, REHAB EVAL
pt. deferring stairs this session secondary to pain with ambulation and wanting to rest, RN made aware. will progress as feasible

## 2021-08-21 NOTE — PHYSICAL THERAPY INITIAL EVALUATION ADULT - IMPAIRMENTS CONTRIBUTING TO GAIT DEVIATIONS, PT EVAL
Pt. in agreement to participate in skilled therapy session despite complaints of pain./narrow base of support/pain/decreased strength

## 2021-08-22 LAB
ALBUMIN SERPL ELPH-MCNC: 3.6 G/DL — SIGNIFICANT CHANGE UP (ref 3.3–5)
ALP SERPL-CCNC: 87 U/L — SIGNIFICANT CHANGE UP (ref 40–120)
ALT FLD-CCNC: 15 U/L — SIGNIFICANT CHANGE UP (ref 4–33)
ANION GAP SERPL CALC-SCNC: 14 MMOL/L — SIGNIFICANT CHANGE UP (ref 7–14)
AST SERPL-CCNC: 57 U/L — HIGH (ref 4–32)
BILIRUB SERPL-MCNC: 0.5 MG/DL — SIGNIFICANT CHANGE UP (ref 0.2–1.2)
BUN SERPL-MCNC: 14 MG/DL — SIGNIFICANT CHANGE UP (ref 7–23)
CALCIUM SERPL-MCNC: 8.9 MG/DL — SIGNIFICANT CHANGE UP (ref 8.4–10.5)
CHLORIDE SERPL-SCNC: 95 MMOL/L — LOW (ref 98–107)
CO2 SERPL-SCNC: 20 MMOL/L — LOW (ref 22–31)
CREAT SERPL-MCNC: 0.78 MG/DL — SIGNIFICANT CHANGE UP (ref 0.5–1.3)
GLUCOSE SERPL-MCNC: 86 MG/DL — SIGNIFICANT CHANGE UP (ref 70–99)
HCT VFR BLD CALC: 36.8 % — SIGNIFICANT CHANGE UP (ref 34.5–45)
HGB BLD-MCNC: 12.5 G/DL — SIGNIFICANT CHANGE UP (ref 11.5–15.5)
MAGNESIUM SERPL-MCNC: 2 MG/DL — SIGNIFICANT CHANGE UP (ref 1.6–2.6)
MCHC RBC-ENTMCNC: 29.9 PG — SIGNIFICANT CHANGE UP (ref 27–34)
MCHC RBC-ENTMCNC: 34 GM/DL — SIGNIFICANT CHANGE UP (ref 32–36)
MCV RBC AUTO: 88 FL — SIGNIFICANT CHANGE UP (ref 80–100)
NRBC # BLD: 0 /100 WBCS — SIGNIFICANT CHANGE UP
NRBC # FLD: 0 K/UL — SIGNIFICANT CHANGE UP
PHOSPHATE SERPL-MCNC: 2.6 MG/DL — SIGNIFICANT CHANGE UP (ref 2.5–4.5)
PLATELET # BLD AUTO: 157 K/UL — SIGNIFICANT CHANGE UP (ref 150–400)
POTASSIUM SERPL-MCNC: 4.7 MMOL/L — SIGNIFICANT CHANGE UP (ref 3.5–5.3)
POTASSIUM SERPL-SCNC: 4.7 MMOL/L — SIGNIFICANT CHANGE UP (ref 3.5–5.3)
PROT SERPL-MCNC: 6.4 G/DL — SIGNIFICANT CHANGE UP (ref 6–8.3)
RBC # BLD: 4.18 M/UL — SIGNIFICANT CHANGE UP (ref 3.8–5.2)
RBC # FLD: 13.2 % — SIGNIFICANT CHANGE UP (ref 10.3–14.5)
SODIUM SERPL-SCNC: 129 MMOL/L — LOW (ref 135–145)
WBC # BLD: 10.39 K/UL — SIGNIFICANT CHANGE UP (ref 3.8–10.5)
WBC # FLD AUTO: 10.39 K/UL — SIGNIFICANT CHANGE UP (ref 3.8–10.5)

## 2021-08-22 RX ORDER — ACETAMINOPHEN 500 MG
500 TABLET ORAL EVERY 6 HOURS
Refills: 0 | Status: DISCONTINUED | OUTPATIENT
Start: 2021-08-22 | End: 2021-08-23

## 2021-08-22 RX ORDER — ACETAMINOPHEN 500 MG
325 TABLET ORAL EVERY 4 HOURS
Refills: 0 | Status: DISCONTINUED | OUTPATIENT
Start: 2021-08-22 | End: 2021-08-22

## 2021-08-22 RX ADMIN — Medication 500 MILLIGRAM(S): at 23:21

## 2021-08-22 RX ADMIN — SODIUM CHLORIDE 3 MILLILITER(S): 9 INJECTION INTRAMUSCULAR; INTRAVENOUS; SUBCUTANEOUS at 05:53

## 2021-08-22 RX ADMIN — Medication 500 MILLIGRAM(S): at 11:40

## 2021-08-22 RX ADMIN — Medication 500 MILLIGRAM(S): at 11:11

## 2021-08-22 RX ADMIN — Medication 1000 UNIT(S): at 11:11

## 2021-08-22 RX ADMIN — Medication 15 MILLIGRAM(S): at 05:45

## 2021-08-22 RX ADMIN — Medication 63.75 MILLIMOLE(S): at 11:12

## 2021-08-22 RX ADMIN — Medication 500 MILLIGRAM(S): at 17:23

## 2021-08-22 RX ADMIN — Medication 500 MILLIGRAM(S): at 17:05

## 2021-08-22 NOTE — PROGRESS NOTE ADULT - SUBJECTIVE AND OBJECTIVE BOX
D TEAM Surgery Progress Note    INTERVAL EVENTS:   - No acute events overnight.    SUBJECTIVE: Patient seen and examined at bedside with surgical team, patient denies pain, nausea or vomit.  Patient has passed gas, no  BM yet.    Tolerating clear diet    OBJECTIVE:    Vital Signs Last 24 Hrs  T(C): 36.7 (22 Aug 2021 09:20), Max: 36.8 (21 Aug 2021 17:35)  T(F): 98.1 (22 Aug 2021 09:20), Max: 98.3 (22 Aug 2021 00:50)  HR: 70 (22 Aug 2021 09:20) (60 - 90)  BP: 124/72 (22 Aug 2021 09:20) (94/53 - 137/69)  BP(mean): --  RR: 16 (22 Aug 2021 09:20) (16 - 17)  SpO2: 95% (22 Aug 2021 09:20) (95% - 98%)  --------------------------------------------------------    I&O's Detail    21 Aug 2021 07:01  -  22 Aug 2021 07:00  --------------------------------------------------------  IN:    IV PiggyBack: 160 mL    Lactated Ringers: 950 mL    Oral Fluid: 620 mL  Total IN: 1730 mL    OUT:    Voided (mL): 1500 mL  Total OUT: 1500 mL    Total NET: 230 mL      MEDICATIONS  (STANDING):  acetaminophen  IVPB .. 500 milliGRAM(s) IV Intermittent every 6 hours  cefoTEtan  IVPB 2 Gram(s) IV Intermittent every 12 hours  cholecalciferol 1000 Unit(s) Oral daily  enoxaparin Injectable 30 milliGRAM(s) SubCutaneous every 24 hours  lactated ringers. 1000 milliLiter(s) (50 mL/Hr) IV Continuous <Continuous>  sodium chloride 0.9% lock flush 3 milliLiter(s) IV Push every 8 hours    MEDICATIONS  (PRN):  aluminum hydroxide/magnesium hydroxide/simethicone Suspension 30 milliLiter(s) Oral every 4 hours PRN Dyspepsia  famotidine    Tablet 20 milliGRAM(s) Oral two times a day PRN reflux  oxyCODONE    IR 2.5 milliGRAM(s) Oral every 4 hours PRN Moderate Pain (4 - 6)  oxyCODONE    IR 5 milliGRAM(s) Oral every 4 hours PRN Severe Pain (7 - 10)      PHYSICAL EXAM:  Constitutional: A&Ox3, NAD  Respiratory: Unlabored breathing  Abdomen: Soft, nondistended, NTTP. No rebound or guarding. dressings in place, c/d/i   Extremities: WWP, WU spontaneously    LABS:                        11.9   14.10 )-----------( 160      ( 21 Aug 2021 06:42 )             36.5     08-21    139  |  102  |  16  ----------------------------<  110<H>  4.1   |  21<L>  |  0.86    Ca    9.2      21 Aug 2021 06:42  Phos  4.2     08-21  Mg     1.90     08-21    TPro  5.7<L>  /  Alb  3.3  /  TBili  0.5  /  DBili  <0.2  /  AST  25  /  ALT  13  /  AlkPhos  85  08-21    ABO Interpretation: O (08-20-21 @ 11:42)      IMAGING:

## 2021-08-22 NOTE — PROGRESS NOTE ADULT - ASSESSMENT
75F s/p robotic assisted lap cholecystectomy with transverse colon injury and repair 8/20.    Plan:    - Pain control  - OOB  - Advance to regular diet.   - serial abd examinations.         D team surgery  #[id number]

## 2021-08-23 ENCOUNTER — TRANSCRIPTION ENCOUNTER (OUTPATIENT)
Age: 76
End: 2021-08-23

## 2021-08-23 VITALS
DIASTOLIC BLOOD PRESSURE: 72 MMHG | SYSTOLIC BLOOD PRESSURE: 133 MMHG | RESPIRATION RATE: 18 BRPM | HEART RATE: 73 BPM | TEMPERATURE: 98 F | OXYGEN SATURATION: 98 %

## 2021-08-23 LAB
ALBUMIN SERPL ELPH-MCNC: 3.9 G/DL — SIGNIFICANT CHANGE UP (ref 3.3–5)
ALP SERPL-CCNC: 96 U/L — SIGNIFICANT CHANGE UP (ref 40–120)
ALT FLD-CCNC: 19 U/L — SIGNIFICANT CHANGE UP (ref 4–33)
ANION GAP SERPL CALC-SCNC: 10 MMOL/L — SIGNIFICANT CHANGE UP (ref 7–14)
AST SERPL-CCNC: 36 U/L — HIGH (ref 4–32)
BILIRUB SERPL-MCNC: 0.3 MG/DL — SIGNIFICANT CHANGE UP (ref 0.2–1.2)
BUN SERPL-MCNC: 16 MG/DL — SIGNIFICANT CHANGE UP (ref 7–23)
CALCIUM SERPL-MCNC: 9.4 MG/DL — SIGNIFICANT CHANGE UP (ref 8.4–10.5)
CHLORIDE SERPL-SCNC: 104 MMOL/L — SIGNIFICANT CHANGE UP (ref 98–107)
CO2 SERPL-SCNC: 25 MMOL/L — SIGNIFICANT CHANGE UP (ref 22–31)
CREAT SERPL-MCNC: 0.8 MG/DL — SIGNIFICANT CHANGE UP (ref 0.5–1.3)
GLUCOSE SERPL-MCNC: 109 MG/DL — HIGH (ref 70–99)
MAGNESIUM SERPL-MCNC: 2.1 MG/DL — SIGNIFICANT CHANGE UP (ref 1.6–2.6)
PHOSPHATE SERPL-MCNC: 3.1 MG/DL — SIGNIFICANT CHANGE UP (ref 2.5–4.5)
POTASSIUM SERPL-MCNC: 4.1 MMOL/L — SIGNIFICANT CHANGE UP (ref 3.5–5.3)
POTASSIUM SERPL-SCNC: 4.1 MMOL/L — SIGNIFICANT CHANGE UP (ref 3.5–5.3)
PROT SERPL-MCNC: 6.6 G/DL — SIGNIFICANT CHANGE UP (ref 6–8.3)
SODIUM SERPL-SCNC: 139 MMOL/L — SIGNIFICANT CHANGE UP (ref 135–145)

## 2021-08-23 RX ORDER — SODIUM CHLORIDE 9 MG/ML
1 INJECTION INTRAMUSCULAR; INTRAVENOUS; SUBCUTANEOUS
Refills: 0 | Status: DISCONTINUED | OUTPATIENT
Start: 2021-08-23 | End: 2021-08-23

## 2021-08-23 RX ORDER — SODIUM CHLORIDE 9 MG/ML
1 INJECTION INTRAMUSCULAR; INTRAVENOUS; SUBCUTANEOUS
Qty: 0 | Refills: 0 | DISCHARGE
Start: 2021-08-23

## 2021-08-23 RX ORDER — OXYCODONE HYDROCHLORIDE 5 MG/1
1 TABLET ORAL
Qty: 8 | Refills: 0
Start: 2021-08-23 | End: 2021-08-24

## 2021-08-23 RX ORDER — IBUPROFEN 200 MG
1 TABLET ORAL
Qty: 0 | Refills: 0 | DISCHARGE

## 2021-08-23 RX ORDER — FAMOTIDINE 10 MG/ML
1 INJECTION INTRAVENOUS
Qty: 0 | Refills: 0 | DISCHARGE

## 2021-08-23 RX ORDER — ACETAMINOPHEN 500 MG
2 TABLET ORAL
Qty: 0 | Refills: 0 | DISCHARGE
Start: 2021-08-23

## 2021-08-23 RX ADMIN — Medication 1000 UNIT(S): at 12:19

## 2021-08-23 RX ADMIN — Medication 500 MILLIGRAM(S): at 12:23

## 2021-08-23 RX ADMIN — Medication 500 MILLIGRAM(S): at 12:19

## 2021-08-23 RX ADMIN — Medication 500 MILLIGRAM(S): at 05:18

## 2021-08-23 NOTE — DISCHARGE NOTE PROVIDER - CARE PROVIDERS DIRECT ADDRESSES
[email or web address]

## 2021-08-23 NOTE — DISCHARGE NOTE NURSING/CASE MANAGEMENT/SOCIAL WORK - PATIENT PORTAL LINK FT
You can access the FollowMyHealth Patient Portal offered by [facility] by registering at the following website: [email or web address]. By joining [employer]’s FollowMyHealth portal, you will also be able to view your health information using other applications (apps) compatible with our system.

## 2021-08-23 NOTE — DISCHARGE NOTE PROVIDER - CARE PROVIDER_API CALL
Crow Best)  Surgery  [address] [place] [address]  Phone: [phone]  Fax: [phone]  Follow Up Time:

## 2021-08-23 NOTE — DISCHARGE NOTE PROVIDER - HOSPITAL COURSE
74 y/o female presents to PST with c/o "stretching, swelling occasional RLQ prickly pain 04/2021". S/P US (05.2021)/ MRI (06/2021) which revealed "a mass". Pt was scheduled for robotic cholecystectomy possible liver resection possible open on 08/20/2021. Pt underwent robot-assisted laparoscopic cholecystectomy and repair of colotomy. Pt tolerated the procedure well and underwent sessions with physical therapy with no anticipation for any home skilled PT needs or rehab.     At this point in time patient is stable for discharge to home. Patient will follow up with Dr. Best within 1-2 weeks. 76 y/o female presents to PST with c/o "stretching, swelling occasional RLQ prickly pain 04/2021". S/P US (05.2021)/ MRI (06/2021) which revealed "a mass". Pt was scheduled for robotic cholecystectomy possible liver resection possible open on 08/20/2021. Pt underwent robot-assisted laparoscopic cholecystectomy for Gallbladder mass and repair of colotomy.  Frozen section consistent with gallbladder adenomyosis.  Pt tolerated the procedure well and underwent sessions with physical therapy with no anticipation for any home skilled PT needs or rehab.     Patient is currently ambulating and voiding without difficutly.  Patient is tolerating a low fiber diet.  Pain is controlled.  Patient is stable for discharge to home per Dr. Best. Patient will follow up with Dr. Best within 1-2 weeks. 74 y/o female presents to PST with c/o "stretching, swelling occasional RLQ prickly pain 04/2021". S/P US (05.2021)/ MRI (06/2021) which revealed "a mass". Pt was scheduled for robotic cholecystectomy possible liver resection possible open on 08/20/2021. Pt underwent robot-assisted laparoscopic cholecystectomy for Gallbladder mass and repair of colotomy.  Frozen section consistent with gallbladder adenomyosis.  Pt tolerated the procedure well and underwent sessions with physical therapy with no anticipation for any home skilled PT needs or rehab.     Patient is currently ambulating and voiding without difficutly.  Patient is tolerating a low fiber diet.  Pain is controlled.  Patient is stable for discharge to home per Dr. Best. Patient will follow up with Dr. Best within 1-2 weeks.     ISTOP: [id number] 76 y/o female presents to PST with c/o "stretching, swelling occasional RLQ prickly pain 04/2021". S/P US (05.2021)/ MRI (06/2021) which revealed "a mass". Pt was scheduled for robotic cholecystectomy possible liver resection possible open on 08/20/2021. Pt underwent robot-assisted laparoscopic cholecystectomy for Gallbladder mass and repair of colotomy.  Frozen section consistent with gallbladder adenomyosis.  Pt tolerated the procedure well and underwent sessions with physical therapy with no anticipation for any home skilled PT needs or rehab.     Patient is currently ambulating and voiding without difficutly.  Patient is tolerating a low fiber diet.  Pain is controlled.  Patient is stable for discharge to home per Dr. Best. Patient will follow up with Dr. Best within 1-2 weeks.

## 2021-08-23 NOTE — DISCHARGE NOTE PROVIDER - NSDCMRMEDTOKEN_GEN_ALL_CORE_FT
Gaviscon 80 mg-14.2 mg oral tablet, chewable: 2 tab(s) orally 4 times a day (after meals and at bedtime), As Needed  METRONIDAZOLE TOPICAL 0.75% GL: apply to affected area OF FACE once daily  nasal drops PRN:   Pepcid 20 mg oral tablet: 1 tab(s) orally 2 times a day, As Needed  Vitamin D3 25 mcg (1000 intl units) oral capsule: 1 cap(s) orally once a day   acetaminophen 325 mg oral tablet: 2 tab(s) orally every 6 hours, As Needed for mild pain.  May alternate every 3 hours with ibuprofen so you are taking one or the other every 3 hours.  Gaviscon 80 mg-14.2 mg oral tablet, chewable: 2 tab(s) orally 4 times a day (after meals and at bedtime), As Needed  ibuprofen 400 mg oral tablet: 1 tab(s) orally every 6 hours, As Needed for mild pain.  May alternate every 3 hours with acetaminophen so you are taking one or the other every 3 hours.  METRONIDAZOLE TOPICAL 0.75% GL: apply to affected area OF FACE once daily  nasal drops PRN:   oxyCODONE 5 mg oral tablet: 1 tab(s) orally every 6 hours, As Needed , moderate pain MDD:4  Pepcid 20 mg oral tablet: 1 tab(s) orally 2 times a day, As needed   Sodium Chloride 1 g oral tablet: 1 tab(s) orally 2 times a day for 2 days.  Vitamin D3 25 mcg (1000 intl units) oral capsule: 1 cap(s) orally once a day   acetaminophen 325 mg oral tablet: 2 tab(s) orally every 6 hours, As Needed for mild pain.  May alternate every 3 hours with ibuprofen so you are taking one or the other every 3 hours.  Gaviscon 80 mg-14.2 mg oral tablet, chewable: 2 tab(s) orally 4 times a day (after meals and at bedtime), As Needed  ibuprofen 400 mg oral tablet: 1 tab(s) orally every 6 hours, As Needed for mild pain.  May alternate every 3 hours with acetaminophen so you are taking one or the other every 3 hours.  METRONIDAZOLE TOPICAL 0.75% GL: apply to affected area OF FACE once daily  nasal drops PRN:   oxyCODONE 5 mg oral tablet: 1 tab(s) orally every 6 hours, As Needed , moderate pain MDD:4  Pepcid 20 mg oral tablet: 1 tab(s) orally 2 times a day, As needed   Vitamin D3 25 mcg (1000 intl units) oral capsule: 1 cap(s) orally once a day   acetaminophen 325 mg oral tablet: 2 tab(s) orally every 6 hours, As Needed for mild pain.  May alternate every 3 hours with ibuprofen so you are taking one or the other every 3 hours.  Gaviscon 80 mg-14.2 mg oral tablet, chewable: 2 tab(s) orally 4 times a day (after meals and at bedtime), As Needed  ibuprofen 400 mg oral tablet: 1 tab(s) orally every 6 hours, As Needed for mild pain.  May alternate every 3 hours with acetaminophen so you are taking one or the other every 3 hours.  METRONIDAZOLE TOPICAL 0.75% GL: apply to affected area OF FACE once daily  nasal drops PRN:   Pepcid 20 mg oral tablet: 1 tab(s) orally 2 times a day, As needed   Vitamin D3 25 mcg (1000 intl units) oral capsule: 1 cap(s) orally once a day

## 2021-08-23 NOTE — PROGRESS NOTE ADULT - ASSESSMENT
75F s/p robotic assisted lap cholecystectomy with transverse colon injury and repair 8/20.    Plan:    - Discharge plan  - Appreciate PT recs ("anticipated discharge to home with no skilled restorative physical therapy services upon discharge from [facility]")  - OOBAT  - Pain control 75F s/p robotic assisted lap cholecystectomy with transverse colon injury and repair 8/20.    Plan:    - Discharge plan  - Appreciate PT recs ("anticipated discharge to home with no skilled restorative physical therapy services upon discharge from [facility]")  - OOBAT  - Pain control  - F/U Sodium level and replete PRN

## 2021-08-23 NOTE — DISCHARGE NOTE PROVIDER - NSDCCPCAREPLAN_GEN_ALL_CORE_FT
PRINCIPAL DISCHARGE DIAGNOSIS  Diagnosis: Adenomyosis, gallbladder  Assessment and Plan of Treatment: s/p Laparoscopic Cholecystectomy repair of colotomy

## 2021-08-23 NOTE — DISCHARGE NOTE PROVIDER - NSDCCPTREATMENT_GEN_ALL_CORE_FT
PRINCIPAL PROCEDURE  Procedure: Laparoscopic cholecystectomy  Findings and Treatment:       SECONDARY PROCEDURE  Procedure: Repair of colotomy  Findings and Treatment:

## 2021-08-23 NOTE — DISCHARGE NOTE PROVIDER - NSDCFUADDINST_GEN_ALL_CORE_FT
WOUND CARE:  Please keep incisions clean and dry. Please do not Scrub or rub incisions. Do not use lotion or powder on incisions  BATHING: Please do not submerge wound underwater. You may shower and/or sponge bathe 48 hours after surgery.  ACTIVITY: No heavy lifting or straining. Otherwise, you may return to your usual level of physical activity. If you are taking narcotic pain medication (such as Oxycodone) DO NOT drive a car, operate machinery or make important decisions.  DIET: Return to your usual diet.  NOTIFY YOUR SURGEON IF: You have any bleeding that does not stop, any pus draining from your wound(s), any fever (over 100.4 F) or chills, persistent nausea/vomiting, persistent diarrhea, or if your pain is not controlled on your discharge pain medications.  FOLLOW-UP: Please follow up with your primary care physician in one week regarding your hospitalization.  Please follow up with your surgeon.  Call today for appointment in 1 week.  WOUND CARE:  Please keep incisions clean and dry. Please do not Scrub or rub incisions. Do not use lotion or powder on incisions.  You have steri strips on your incisions.  Do not remove these steri strips.  Either they will fall off on their own or Dr. Best will remove them at your office follow up.  BATHING: Please do not submerge wound underwater. You may shower and/or sponge bathe 48 hours after surgery.  ACTIVITY: No heavy lifting or straining. Otherwise, you may return to your usual level of physical activity. If you are taking narcotic pain medication (such as Oxycodone) DO NOT drive a car, operate machinery or make important decisions.  DIET: Return to your usual diet.  NOTIFY YOUR SURGEON IF: You have any bleeding that does not stop, any pus draining from your wound(s), any fever (over 100.4 F) or chills, persistent nausea/vomiting, persistent diarrhea, or if your pain is not controlled on your discharge pain medications.  FOLLOW-UP: Please follow up with your primary care physician in one week regarding your hospitalization/medications and medical problems.  Please follow up with your surgeon (Dr. Best).  Call today for appointment in 1 week. [phone]

## 2021-08-23 NOTE — DISCHARGE NOTE NURSING/CASE MANAGEMENT/SOCIAL WORK - NSDCPEFALRISK_GEN_ALL_CORE
For information on Fall & injury Prevention, visit [email or web address]

## 2021-08-23 NOTE — PROGRESS NOTE ADULT - SUBJECTIVE AND OBJECTIVE BOX
TEAM D Surgery Progress Note    INTERVAL EVENTS:   No acute events overnight.    SUBJECTIVE: Patient seen and examined at bedside. Denies chills, CP, SOB nausea, vomiting. Has had 1 BM yesterday.    OBJECTIVE:    Vital Signs Last 24 Hrs  T(C): 36.4 (23 Aug 2021 05:16), Max: 37.1 (23 Aug 2021 00:25)  T(F): 97.5 (23 Aug 2021 05:16), Max: 98.7 (23 Aug 2021 00:25)  HR: 64 (23 Aug 2021 05:16) (64 - 76)  BP: 152/79 (23 Aug 2021 05:16) (124/72 - 152/79)  BP(mean): --  RR: 17 (23 Aug 2021 05:16) (16 - 17)  SpO2: 96% (23 Aug 2021 05:16) (95% - 100%)I&O's Detail    22 Aug 2021 07:01  -  23 Aug 2021 07:00  --------------------------------------------------------  IN:    Oral Fluid: 150 mL  Total IN: 150 mL    OUT:    Voided (mL): 2000 mL  Total OUT: 2000 mL    Total NET: -1850 mL      MEDICATIONS  (STANDING):  acetaminophen   Tablet .. 500 milliGRAM(s) Oral every 6 hours  cholecalciferol 1000 Unit(s) Oral daily  enoxaparin Injectable 30 milliGRAM(s) SubCutaneous every 24 hours  ketorolac   Injectable 15 milliGRAM(s) IV Push every 12 hours    MEDICATIONS  (PRN):  aluminum hydroxide/magnesium hydroxide/simethicone Suspension 30 milliLiter(s) Oral every 4 hours PRN Dyspepsia  famotidine    Tablet 20 milliGRAM(s) Oral two times a day PRN reflux  oxyCODONE    IR 2.5 milliGRAM(s) Oral every 4 hours PRN Moderate Pain (4 - 6)  oxyCODONE    IR 5 milliGRAM(s) Oral every 4 hours PRN Severe Pain (7 - 10)      PHYSICAL EXAM:  Constitutional: A&Ox3, NAD  Respiratory: Nonlabored breathing  Abdomen: Soft, nondistended, NTTP. No rebound or guarding. Dressing in place, clear, dry, intact.      LABS:                        12.5   10.39 )-----------( 157      ( 22 Aug 2021 07:24 )             36.8     08-22    129<L>  |  95<L>  |  14  ----------------------------<  86  4.7   |  20<L>  |  0.78    Ca    8.9      22 Aug 2021 07:24  Phos  2.6     08-22  Mg     2.00     08-22    TPro  6.4  /  Alb  3.6  /  TBili  0.5  /  DBili  x   /  AST  57<H>  /  ALT  15  /  AlkPhos  87  08-22      LIVER FUNCTIONS - ( 22 Aug 2021 07:24 )  Alb: 3.6 g/dL / Pro: 6.4 g/dL / ALK PHOS: 87 U/L / ALT: 15 U/L / AST: 57 U/L

## 2021-08-24 PROBLEM — M41.9 SCOLIOSIS, UNSPECIFIED: Chronic | Status: ACTIVE | Noted: 2021-08-09

## 2021-08-24 PROBLEM — I34.1 NONRHEUMATIC MITRAL (VALVE) PROLAPSE: Chronic | Status: ACTIVE | Noted: 2021-08-09

## 2021-08-24 PROBLEM — K58.9 IRRITABLE BOWEL SYNDROME WITHOUT DIARRHEA: Chronic | Status: ACTIVE | Noted: 2021-08-09

## 2021-08-24 PROBLEM — K58.9 IRRITABLE BOWEL SYNDROME, UNSPECIFIED: Chronic | Status: ACTIVE | Noted: 2021-08-09

## 2021-08-24 PROBLEM — J38.3 OTHER DISEASES OF VOCAL CORDS: Chronic | Status: ACTIVE | Noted: 2021-08-09

## 2021-08-24 PROBLEM — K44.9 DIAPHRAGMATIC HERNIA WITHOUT OBSTRUCTION OR GANGRENE: Chronic | Status: ACTIVE | Noted: 2021-08-09

## 2021-08-24 PROBLEM — K21.9 GASTRO-ESOPHAGEAL REFLUX DISEASE WITHOUT ESOPHAGITIS: Chronic | Status: ACTIVE | Noted: 2021-08-09

## 2021-08-26 ENCOUNTER — APPOINTMENT (OUTPATIENT)
Dept: SURGICAL ONCOLOGY | Facility: CLINIC | Age: 76
End: 2021-08-26
Payer: MEDICARE

## 2021-08-26 VITALS
BODY MASS INDEX: 14.88 KG/M2 | DIASTOLIC BLOOD PRESSURE: 87 MMHG | OXYGEN SATURATION: 98 % | HEIGHT: 63 IN | WEIGHT: 84 LBS | TEMPERATURE: 98 F | RESPIRATION RATE: 16 BRPM | SYSTOLIC BLOOD PRESSURE: 132 MMHG | HEART RATE: 80 BPM

## 2021-08-26 PROCEDURE — 99024 POSTOP FOLLOW-UP VISIT: CPT

## 2021-08-27 ENCOUNTER — NON-APPOINTMENT (OUTPATIENT)
Age: 76
End: 2021-08-27

## 2021-08-28 LAB — SURGICAL PATHOLOGY STUDY: SIGNIFICANT CHANGE UP

## 2021-08-31 NOTE — HISTORY OF PRESENT ILLNESS
[de-identified] : Ms. Delgado is a 76 y/o female who presented with 2-3 month history of RUQ abdominal pain with associated acid reflux.\par She denies fever/chills, nausea/emesis or changes in bowel habits.\par Abdominal ultrasound 05/28/2021 demonstrated a 1.3 x 1 cm soft tissue mass in the gallbladder fundus.\par MRI abdomen 06/10/2021 confirmed 1.5 x 1.3 cm nodule adjacent to the gallbladder fundus.\par CT abdomen/pelvis 07/08/2021 showed a difficult to delineate soft tissue density adjacent to the gallbladder measuring about 1 cm.\par She is referred for evaluation and treatment.\par \par 08/26/2021: Patient is s/p robotic cholecystectomy/repair of colotomy 08/20/2021.  Pain and eating improving.  She denies fever.  Intraoperative frozen section of gallbladder mass was benign.

## 2021-09-02 ENCOUNTER — APPOINTMENT (OUTPATIENT)
Dept: SURGICAL ONCOLOGY | Facility: CLINIC | Age: 76
End: 2021-09-02
Payer: MEDICARE

## 2021-09-02 VITALS
HEIGHT: 63 IN | RESPIRATION RATE: 16 BRPM | SYSTOLIC BLOOD PRESSURE: 136 MMHG | HEART RATE: 79 BPM | OXYGEN SATURATION: 99 % | DIASTOLIC BLOOD PRESSURE: 88 MMHG | BODY MASS INDEX: 14.35 KG/M2 | WEIGHT: 81 LBS

## 2021-09-02 PROCEDURE — 99024 POSTOP FOLLOW-UP VISIT: CPT

## 2021-09-07 NOTE — PHYSICAL EXAM
[FreeTextEntry1] : Abdomen: soft, nontender/nondistended.  Mild erythema overlying infraumbilical incision, but no fluctuance or expressible purulence.

## 2021-09-07 NOTE — HISTORY OF PRESENT ILLNESS
[de-identified] : Ms. Delgado is a 76 y/o female who presented with 2-3 month history of RUQ abdominal pain with associated acid reflux.\par She denies fever/chills, nausea/emesis or changes in bowel habits.\par Abdominal ultrasound 05/28/2021 demonstrated a 1.3 x 1 cm soft tissue mass in the gallbladder fundus.\par MRI abdomen 06/10/2021 confirmed 1.5 x 1.3 cm nodule adjacent to the gallbladder fundus.\par CT abdomen/pelvis 07/08/2021 showed a difficult to delineate soft tissue density adjacent to the gallbladder measuring about 1 cm.\par She is referred for evaluation and treatment.\par \par 08/26/2021: Patient is s/p robotic cholecystectomy/repair of colotomy 08/20/2021.  Pain and eating improving.  She denies fever.  Intraoperative frozen section of gallbladder mass was benign.\par \par 09/02/2021: Improving.  She is tolerating diet without nausea/emesis.  She denies fever.

## 2021-09-07 NOTE — ASSESSMENT
[FreeTextEntry1] : Course of oral antibiotics.\par Follow up in 2 weeks, sooner as clinically indicated. yes

## 2021-09-16 ENCOUNTER — APPOINTMENT (OUTPATIENT)
Dept: SURGICAL ONCOLOGY | Facility: CLINIC | Age: 76
End: 2021-09-16
Payer: MEDICARE

## 2021-09-16 VITALS
HEART RATE: 38 BPM | OXYGEN SATURATION: 99 % | DIASTOLIC BLOOD PRESSURE: 81 MMHG | HEIGHT: 63 IN | SYSTOLIC BLOOD PRESSURE: 142 MMHG | RESPIRATION RATE: 16 BRPM | WEIGHT: 81 LBS | BODY MASS INDEX: 14.35 KG/M2

## 2021-09-16 DIAGNOSIS — R10.31 RIGHT LOWER QUADRANT PAIN: ICD-10-CM

## 2021-09-16 DIAGNOSIS — K82.8 OTHER SPECIFIED DISEASES OF GALLBLADDER: ICD-10-CM

## 2021-09-16 PROCEDURE — 99024 POSTOP FOLLOW-UP VISIT: CPT

## 2021-09-23 PROBLEM — R10.31 COLICKY RLQ ABDOMINAL PAIN: Status: ACTIVE | Noted: 2021-04-27

## 2021-09-23 PROBLEM — K82.8 GALLBLADDER MASS: Status: ACTIVE | Noted: 2021-06-04

## 2021-09-23 NOTE — HISTORY OF PRESENT ILLNESS
[de-identified] : Ms. Delgado is a 74 y/o female who presented with 2-3 month history of RUQ abdominal pain with associated acid reflux.\par She denies fever/chills, nausea/emesis or changes in bowel habits.\par Abdominal ultrasound 05/28/2021 demonstrated a 1.3 x 1 cm soft tissue mass in the gallbladder fundus.\par MRI abdomen 06/10/2021 confirmed 1.5 x 1.3 cm nodule adjacent to the gallbladder fundus.\par CT abdomen/pelvis 07/08/2021 showed a difficult to delineate soft tissue density adjacent to the gallbladder measuring about 1 cm.\par She is referred for evaluation and treatment.\par \par 08/26/2021: Patient is s/p robotic cholecystectomy/repair of colotomy 08/20/2021.  Pain and eating improving.  She denies fever.  Intraoperative frozen section of gallbladder mass was benign.\par \par 09/02/2021: Improving.  She is tolerating diet without nausea/emesis.  She denies fever.\par \par 09/16/2021: Patient continues to improve.  She has minimal abdominal pain and denies nausea/emesis.  She reports gaseous discomfort.  Infraumbilical incision erythema has resolved.

## 2021-09-23 NOTE — ASSESSMENT
[FreeTextEntry1] : Diet and activity as tolerated.\par Follow up with GI.\par May follow up in 2-3 months as needed.

## 2021-09-23 NOTE — PHYSICAL EXAM
[FreeTextEntry1] : Abdomen: soft, nontender/nondistended.  Resolved erythema overlying infraumbilical incision, but no fluctuance or expressible purulence.

## 2021-10-07 ENCOUNTER — APPOINTMENT (OUTPATIENT)
Dept: GASTROENTEROLOGY | Facility: CLINIC | Age: 76
End: 2021-10-07
Payer: MEDICARE

## 2021-10-07 VITALS
OXYGEN SATURATION: 98 % | BODY MASS INDEX: 16.53 KG/M2 | HEIGHT: 59 IN | RESPIRATION RATE: 16 BRPM | SYSTOLIC BLOOD PRESSURE: 130 MMHG | WEIGHT: 82 LBS | TEMPERATURE: 98 F | DIASTOLIC BLOOD PRESSURE: 80 MMHG | HEART RATE: 66 BPM

## 2021-10-07 DIAGNOSIS — K21.9 GASTRO-ESOPHAGEAL REFLUX DISEASE W/OUT ESOPHAGITIS: ICD-10-CM

## 2021-10-07 DIAGNOSIS — K59.09 OTHER CONSTIPATION: ICD-10-CM

## 2021-10-07 DIAGNOSIS — R10.9 UNSPECIFIED ABDOMINAL PAIN: ICD-10-CM

## 2021-10-07 PROCEDURE — 99214 OFFICE O/P EST MOD 30 MIN: CPT

## 2021-10-10 PROBLEM — R10.9 ABDOMINAL DISCOMFORT: Status: ACTIVE | Noted: 2021-10-07

## 2021-10-10 PROBLEM — K21.9 GERD (GASTROESOPHAGEAL REFLUX DISEASE): Status: ACTIVE | Noted: 2021-04-27

## 2021-10-10 PROBLEM — K59.09 CONSTIPATION, CHRONIC: Status: ACTIVE | Noted: 2021-10-07

## 2021-10-10 RX ORDER — AMOXICILLIN AND CLAVULANATE POTASSIUM 500; 125 MG/1; MG/1
500-125 TABLET, FILM COATED ORAL
Qty: 14 | Refills: 0 | Status: DISCONTINUED | COMMUNITY
Start: 2021-09-02 | End: 2021-10-10

## 2021-10-10 NOTE — PHYSICAL EXAM
[General Appearance - Alert] : alert [General Appearance - In No Acute Distress] : in no acute distress [Sclera] : the sclera and conjunctiva were normal [Abdomen Soft] : soft [Bowel Sounds] : normal bowel sounds [Abdomen Tenderness] : non-tender [] : no hepato-splenomegaly [Abdomen Mass (___ Cm)] : no abdominal mass palpated [Abnormal Walk] : normal gait [Affect] : the affect was normal [FreeTextEntry1] : No confusion

## 2021-10-10 NOTE — HISTORY OF PRESENT ILLNESS
[FreeTextEntry1] : Migratory abd gas discomfort.\par BM every 2 days with very small dose of daily Benefiber\par No alarm signs, such as fever, chills, sweats, nausea, vomiting, abdominal distention, melena, hematochezia, jaundice, weight loss.\par \par Had interval cholecystectomy/repair of colotomy\par Gas (burping/flatus) moderately worse after that\par \par Preop CT noted.  \par \par EXAM:  CT ABDOMEN AND PELVIS OC IC\par \par PROCEDURE DATE:  07/08/2021\par \par INTERPRETATION:  CLINICAL INFORMATION: Right lower quadrant pain\par Gallbladder mass\par COMPARISON: CT 6/21\par \par CONTRAST/COMPLICATIONS:\par IV Contrast: Omnipaque 350  90 cc administered   10 cc discarded\par Oral Contrast: Omnipaque 300\par Complications: None reported at time of study completion\par \par PROCEDURE:\par CT of the Abdomen and Pelvis was performed.\par Sagittal and coronal reformats were performed.\par \par FINDINGS:\par LOWER CHEST: Within normal limits.\par \par LIVER: Within normal limits.\par BILE DUCTS: Normal caliber.\par GALLBLADDER: Difficult to appreciate soft tissue density adjacent to the gallbladder fossa measuring approximately 1 cm in size series 2 image 52 and series 602 image 15\par SPLEEN: Within normal limits.\par PANCREAS: Within normal limits.\par ADRENALS: Within normal limits.\par KIDNEYS/URETERS: Within normal limits.\par \par BLADDER: Collapsed\par REPRODUCTIVE ORGANS: Uterus and adnexa within normal limits.\par \par BOWEL: Fecal material seen throughout the colon. No evidence of colonic wall thickening or pericolonic inflammatory changes. The small bowel appears be intact Appendix is not visualized. No evidence of inflammation in the pericecal region.\par PERITONEUM: No ascites.\par VESSELS: Within normal limits.\par RETROPERITONEUM/LYMPH NODES: No lymphadenopathy.\par ABDOMINAL WALL: Within normal limits.\par BONES: Large Tarlov cysts involving the sacrum\par \par IMPRESSION:\par Small enhancing nodule adjacent to gallbladder fundus which is best seen on sonogram. Remainder liver and biliary tree are unremarkable.\par \par Large amount of fecal material seen throughout the colon but no evidence of definable bowel wall thickening or distention.\par \par Pathology:\par \par Surgical Final Report\par \par Final Diagnosis\par 1. Gallbladder, cholecystectomy:\par - Chronic cholecystitis.\par - Adenomyoma.\par -\par 2. Portion of colon, excision:\par - Colon with no significant diagnostic alterations.\par \par

## 2021-10-10 NOTE — ASSESSMENT
[FreeTextEntry1] : Impression:\par \par #1.  Gallbladder mass with mild RUQ tenderness, now s/p cholecystectomy/repair of colotomy, pathology benign\par \par #2.  Abd bloating, may be IBS with constipation given preop CT scan with much stool in colon and colotomy during cholecystectomy.  No signs of infection or obstruction.\par \par #3.  Heartburn, subacute, responsive to Pepcid.\par \par #4.  Low weight (85 lb) (was told by GI in 2000's that she had to eat bland food)\par \par #5.  Spasmodic dysphonia \par \par Plan:\par \par #1.  Liberalize diet.\par \par #2,  Continue Benefiber\par \par #3.  Add Gas-X PRN gas\par \par #4.  Add Miralax daily x 1 week then titrate to adequate bowel movements\par \par #5.  If alarm signs happen or gas/bloating worsens, call office and obtain labs/CT, requisitions provided to patient.\par \par Office followup in 6 weeks.

## 2021-10-10 NOTE — CONSULT LETTER
[Dear  ___] : Dear  [unfilled], [Consult Letter:] : I had the pleasure of evaluating your patient, [unfilled]. [Please see my note below.] : Please see my note below. [Consult Closing:] : Thank you very much for allowing me to participate in the care of this patient.  If you have any questions, please do not hesitate to contact me. [Sincerely,] : Sincerely, [FreeTextEntry3] : Himanshu James MD, MPH, FASGE\par Chief of Clinical Quality in Gastroenterology, [facility]\par Associate Chief of Gastroenterology, [facility]\par Director of Endoscopic Ultrasound, [facility]\par [address], [address]\par [place], [address]\par 24 hours [phone]\par \par

## 2021-10-11 ENCOUNTER — APPOINTMENT (OUTPATIENT)
Dept: FAMILY MEDICINE | Facility: CLINIC | Age: 76
End: 2021-10-11

## 2021-10-18 ENCOUNTER — APPOINTMENT (OUTPATIENT)
Dept: FAMILY MEDICINE | Facility: CLINIC | Age: 76
End: 2021-10-18
Payer: MEDICARE

## 2021-10-18 PROCEDURE — 90662 IIV NO PRSV INCREASED AG IM: CPT

## 2021-10-18 PROCEDURE — G0008: CPT

## 2021-12-16 ENCOUNTER — APPOINTMENT (OUTPATIENT)
Dept: FAMILY MEDICINE | Facility: CLINIC | Age: 76
End: 2021-12-16
Payer: MEDICARE

## 2021-12-16 VITALS
TEMPERATURE: 98.5 F | OXYGEN SATURATION: 98 % | HEART RATE: 78 BPM | SYSTOLIC BLOOD PRESSURE: 134 MMHG | RESPIRATION RATE: 16 BRPM | DIASTOLIC BLOOD PRESSURE: 72 MMHG

## 2021-12-16 DIAGNOSIS — H61.22 IMPACTED CERUMEN, LEFT EAR: ICD-10-CM

## 2021-12-16 PROCEDURE — 99213 OFFICE O/P EST LOW 20 MIN: CPT

## 2021-12-16 NOTE — REVIEW OF SYSTEMS
[Hearing Loss] : hearing loss [Negative] : Psychiatric [Earache] : no earache [FreeTextEntry4] : left ear, cerumen impaction

## 2021-12-16 NOTE — HISTORY OF PRESENT ILLNESS
[FreeTextEntry8] : 77 yo female presents to the office for hearing loss in left ear, possible ear irrigation. the patient states that she previously had regular f/u appointments with ENT to chronic cerumen impaction and would have her ears cleaned, but her ENT retired. she denies any pain, has not been using OTC remedies for possible impaction.

## 2021-12-16 NOTE — PHYSICAL EXAM
[Coordination Grossly Intact] : coordination grossly intact [No Focal Deficits] : no focal deficits [Normal Gait] : normal gait [Speech Grossly Normal] : speech grossly normal [Alert and Oriented x3] : oriented to person, place, and time [Normal Mood] : the mood was normal [Normal] : affect was normal and insight and judgment were intact [de-identified] : left ear cerumen impaction, right TM normal

## 2022-03-24 ENCOUNTER — APPOINTMENT (OUTPATIENT)
Dept: GASTROENTEROLOGY | Facility: CLINIC | Age: 77
End: 2022-03-24

## 2022-08-09 ENCOUNTER — APPOINTMENT (OUTPATIENT)
Dept: FAMILY MEDICINE | Facility: CLINIC | Age: 77
End: 2022-08-09

## 2022-08-15 ENCOUNTER — APPOINTMENT (OUTPATIENT)
Dept: FAMILY MEDICINE | Facility: CLINIC | Age: 77
End: 2022-08-15

## 2022-08-15 VITALS
DIASTOLIC BLOOD PRESSURE: 86 MMHG | BODY MASS INDEX: 16.16 KG/M2 | HEART RATE: 81 BPM | OXYGEN SATURATION: 98 % | TEMPERATURE: 99.2 F | SYSTOLIC BLOOD PRESSURE: 126 MMHG | RESPIRATION RATE: 16 BRPM | WEIGHT: 80 LBS

## 2022-08-15 DIAGNOSIS — M79.673 PAIN IN UNSPECIFIED FOOT: ICD-10-CM

## 2022-08-15 DIAGNOSIS — G89.29 PAIN IN UNSPECIFIED FOOT: ICD-10-CM

## 2022-08-15 PROCEDURE — 99214 OFFICE O/P EST MOD 30 MIN: CPT

## 2022-08-15 RX ORDER — MELOXICAM 7.5 MG/1
7.5 TABLET ORAL
Qty: 20 | Refills: 0 | Status: ACTIVE | COMMUNITY
Start: 2022-08-15 | End: 1900-01-01

## 2022-08-16 RX ORDER — LIDOCAINE 5 G/100G
5 OINTMENT TOPICAL
Qty: 2 | Refills: 0 | Status: ACTIVE | COMMUNITY
Start: 2022-08-16 | End: 1900-01-01

## 2022-08-16 RX ORDER — DICLOFENAC SODIUM 1% 10 MG/G
1 GEL TOPICAL
Qty: 1 | Refills: 1 | Status: ACTIVE | COMMUNITY
Start: 2022-08-16 | End: 1900-01-01

## 2022-08-17 ENCOUNTER — APPOINTMENT (OUTPATIENT)
Dept: ORTHOPEDIC SURGERY | Facility: CLINIC | Age: 77
End: 2022-08-17

## 2022-08-17 VITALS
DIASTOLIC BLOOD PRESSURE: 92 MMHG | BODY MASS INDEX: 16.13 KG/M2 | HEIGHT: 59 IN | SYSTOLIC BLOOD PRESSURE: 137 MMHG | WEIGHT: 80 LBS | HEART RATE: 74 BPM

## 2022-08-17 DIAGNOSIS — M25.531 PAIN IN RIGHT WRIST: ICD-10-CM

## 2022-08-17 DIAGNOSIS — M19.042 PRIMARY OSTEOARTHRITIS, LEFT HAND: ICD-10-CM

## 2022-08-17 DIAGNOSIS — D16.12 BENIGN NEOPLASM OF SHORT BONES OF LEFT UPPER LIMB: ICD-10-CM

## 2022-08-17 DIAGNOSIS — M25.532 PAIN IN LEFT WRIST: ICD-10-CM

## 2022-08-17 DIAGNOSIS — M11.231 OTHER CHONDROCALCINOSIS, RIGHT WRIST: ICD-10-CM

## 2022-08-17 DIAGNOSIS — M19.041 PRIMARY OSTEOARTHRITIS, RIGHT HAND: ICD-10-CM

## 2022-08-17 PROCEDURE — 73110 X-RAY EXAM OF WRIST: CPT | Mod: LT

## 2022-08-17 PROCEDURE — 99203 OFFICE O/P NEW LOW 30 MIN: CPT

## 2022-08-17 NOTE — DISCUSSION/SUMMARY
[FreeTextEntry1] : The patient has arthritis in multiple locations in both hands and wrist.  There is\par 1 bilateral pantrapezial arthritis.  \par 2. bilateral small joint arthritis including several DIP joints \par 3.  Thumb IP joint arthritis bilaterally.\par 4.  Lytic lesion middle phalanx left ring finger most likely enchondroma.\par 5.  Chondrocalcinosis right wrist, TFCC region\par \par I have explained to patient that she has scattered arthritis in multiple joints which is typical for 76-year-old individual.  I explained to patient that some degree of pain is to be expected and that she should not be worried about the pain because it is commensurate with the osteoarthritis.  Diclofenac gel can be applied to painful joints 3 times per day and may be helpful, but may be not.  If the patient can manage with the discomfort and no further treatment required for the osteoarthritis.\par The patient has a large lytic lesion filling the proximal 60% of ring finger middle phalanx with expanded and thin cortices.  The radiographic appearance suggests enchondroma.  Because of its size and the long amount of time that it is likely been present, and because the patient has no pain, I recommend no intervention and monitoring only.  I cautioned the patient that there is a distinct risk of pathological fracture because of the thin bone cortices.  I explained to the patient that the cortex can crack like a negative and that she must be extremely careful and avoid impact or strenuous uses of the left ring finger.\par The wrist swelling the patient describes may be related to the chondrocalcinosis and TFCC region, right wrist.  If the patient has recurrence of severe redness swelling and pain she could try meloxicam 15 mg which she has at home and wear a wrist splint.  Drug warning explained.  If this is not sufficient to control pain patient might benefit from an intra-articular steroid injection.  In that case the patient should contact office and arrange immediate evaluation in case an injection would be appropriate.\par Patient has no other identifiable serious problem that requires treatment.\par Patient has multiple relatively mild problems which should allow her to perform her desired ADLs without particular difficulty.  If the patient has worsening symptoms, questions or concerns, the patient should return.\par Prognosis limited.\par Return as needed\par A lengthy and detailed discussion was held with the patient regarding analysis, treatment, and recommendations. All questions have been answered. At the conclusion the patient expressed acceptance, understanding and agreement with the plan.\par \par

## 2022-08-17 NOTE — HISTORY OF PRESENT ILLNESS
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.\par Patient has history of IBS.\par Patient was prescribed meloxicam 15 mg which she has not taken.\par Patient was prescribed diclofenac gel which she has not used.\par \par HPI: \par Pt reports that right hand swelled once in  January,and 2 times since:  in July 25 and August 8th.\par Swelling lasted for approximately 5 days.\par Patient points to the carpometacarpal region where there was swelling and pain.\par Pain today is less than it was but has not subsided completely.\par The patient did not take any medication or applying any diclofenac gel.\par Patient reports that she has a wrist splint which she wears occasionally but not usually.\par Today the right wrist pain is a minor factor.\par \par Today the patient describes pain in multiple locations in both hands.\par Patient believes that she has "arthritis" and requests evaluation and recommendations.\par Patient denies numbness, tingling, triggering.\par Patient states that she has not had radiographs.\par \par Patient has had previous shoulder pain.\par Patient relates that the pain "moves around".\par Patient has had therapy for the shoulder.\par \par Patient states that she does a considerable amount of handwriting as well as keyboarding/typing.\par 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

## 2022-08-17 NOTE — PHYSICAL EXAM
[de-identified] : Right  shoulder \par mild discomfort with shoulder motion\par Right elbow motion is good without associated pain\par Right wrist:\par Slight swelling across CMC joint 2-5\par Slight tenderness associated\par Basal joint slightly prominent with adduction\par 1-2+ crepitus, 1+ pain\par STT joint tenderness, mild pain with manipulation recreating symptoms in this area\par Wrist extension/flexion: 45/45 degrees with pain at extreme extension, flexion, radial deviation\par Mild tenderness over TFCC.  No redness or swelling.\par No other notable wrist finding.\par \par Right hand:\par MP joints without swelling tenderness and good motion.  \par PIP 5 slight tenderness.PIP 2, 3, 4 no notable tenderness.\par PIP flexion all fingers 95 to 105 degrees.\par Large Heberden's nodes with tenderness DIP 3.\par Small Heberden's nodes DIP 4, 5 with tenderness to DIP 5 and mild extensor lag.\par Heberden's nodes and some tenderness thumb IP joint.\par No A1 pulley tenderness and no triggering in any finger.\par \par Left wrist\par Tenderness and mild swelling STT joint and basal joint.\par Basal joint prominent with adduction and stiffness.\par Basal joint manipulation 2+ pain, trace crepitus.\par No other notable wrist finding.\par MP joints no swelling no tenderness good motion.\par Slight palpable enlargement ring finger middle phalanx consistent with radiographic imaging of enlargement.  No tenderness of ring finger middle phalanx.\par PIP joints good motion.\par Heberden's nodes, tenderness, pain at maximum flexion DIP 3, 4, 5.\par Thumb IP joint slight Heberden's node dorsal radially.\par No A1 pulley tenderness and no triggering in any finger.\par No additional pertinent positive contributory findings.\par \par Neurologic: Median, ulnar, and radial motor and sensory are intact. Phalen's test with median nerve compression: negative. \par Skin: No cyanosis, clubbing, edema or rashes.\par Vascular: Radial pulses intact.\par Lymphatic: No streaking or epitrochlear adenopathy.\par The patient is awake, alert, and oriented. Affect appropriate. Cooperative.  [de-identified] : Radiographs ordered and interpreted by me today, reviewed and discussed with the patient today.\par \par 4 views right wrist and hand.\par Sclerosis narrowing and joint surface irregularity STT joint.\par Slight sclerosis, slight flattening, volar beak sharp osteophyte formation trapezio first metacarpal space\par Changes consistent with pantrapezial arthritis; basal joint arthritis stage II.\par Radiocarpal and midcarpal joints, other than STT joint with good joint spaces.\par No joint space narrowing.\par Faint radiodensity in the region of ulnar styloid and TFCC consistent with chondrocalcinosis.\par MP joint spaces well-maintained.\par PIP 2, 3, 5 joint spaces maintained.\par Slight narrowing PIP 4 joint space\par Loss of joint space sclerosis osteophyte formation thumb IP joint, DIP 5\par Erosive degenerative change DIP 3.\par No notable degenerative change DIP 2, DIP 4.\par No fractures or dislocations.\par \par 4 views left wrist and hand.\par Sclerosis, narrowing, joint erosion STT joint.\par Degenerative change with facet hypertrophic spur formation and dysplasia of first metacarpal and dorsal subluxation at basal joint\par In combination pantrapezial arthritis.\par In isolation basal joint arthritis stage III.\par MP and PIP joint spaces maintained.\par Small osteophyte dorsal radial corner distal phalanx thumb at IP joint.\par Large expansile lesion proximal two thirds ring finger proximal phalanx with thin cortices and irregular radiodensity within lesion.\par Lesion has characteristics of enchondroma.\par Lesion extends to subchondral bone at proximal articular surface of middle phalanx.\par Narrowing and sclerosis DIP 3, 4, 5.\par Joint space maintained DIP 2\par No fractures or dislocations

## 2022-08-21 NOTE — ASSESSMENT
[FreeTextEntry1] : VSS \par symptoms most likely due to arthritis \par medication as prescribed, medication education done \par referred to orthopedics\par follow up in office if sx persists or worsens\par patient verbalizes understanding and is stable upon d/c\par

## 2022-08-21 NOTE — HISTORY OF PRESENT ILLNESS
[FreeTextEntry8] : c/o b/l hand pain \par has been ongoing for a few years\par notes height has decreased\par taking vitamin D daily \par denies any history of trauma or fall\par denies any numbness or tingling\par denies any other self treatment   \par denies any other associated symptoms\par denies any other complaints or concerns at this time

## 2022-08-29 ENCOUNTER — APPOINTMENT (OUTPATIENT)
Dept: FAMILY MEDICINE | Facility: CLINIC | Age: 77
End: 2022-08-29

## 2022-08-29 DIAGNOSIS — Z23 ENCOUNTER FOR IMMUNIZATION: ICD-10-CM

## 2022-08-29 PROCEDURE — 90715 TDAP VACCINE 7 YRS/> IM: CPT | Mod: GY

## 2022-08-29 PROCEDURE — 90471 IMMUNIZATION ADMIN: CPT

## 2022-10-17 ENCOUNTER — APPOINTMENT (OUTPATIENT)
Dept: FAMILY MEDICINE | Facility: CLINIC | Age: 77
End: 2022-10-17

## 2022-10-17 DIAGNOSIS — Z23 ENCOUNTER FOR IMMUNIZATION: ICD-10-CM

## 2022-10-17 PROCEDURE — 90662 IIV NO PRSV INCREASED AG IM: CPT

## 2022-10-17 PROCEDURE — G0008: CPT

## 2022-11-08 ENCOUNTER — APPOINTMENT (OUTPATIENT)
Dept: FAMILY MEDICINE | Facility: CLINIC | Age: 77
End: 2022-11-08

## 2022-11-08 VITALS
HEART RATE: 73 BPM | TEMPERATURE: 98.3 F | OXYGEN SATURATION: 99 % | RESPIRATION RATE: 16 BRPM | SYSTOLIC BLOOD PRESSURE: 130 MMHG | DIASTOLIC BLOOD PRESSURE: 70 MMHG

## 2022-11-08 DIAGNOSIS — M19.042 PRIMARY OSTEOARTHRITIS, RIGHT HAND: ICD-10-CM

## 2022-11-08 DIAGNOSIS — J32.9 CHRONIC SINUSITIS, UNSPECIFIED: ICD-10-CM

## 2022-11-08 DIAGNOSIS — M19.041 PRIMARY OSTEOARTHRITIS, RIGHT HAND: ICD-10-CM

## 2022-11-08 PROCEDURE — 99214 OFFICE O/P EST MOD 30 MIN: CPT

## 2022-11-16 ENCOUNTER — NON-APPOINTMENT (OUTPATIENT)
Age: 77
End: 2022-11-16

## 2022-11-16 ENCOUNTER — APPOINTMENT (OUTPATIENT)
Dept: FAMILY MEDICINE | Facility: CLINIC | Age: 77
End: 2022-11-16

## 2022-11-16 VITALS
OXYGEN SATURATION: 99 % | RESPIRATION RATE: 16 BRPM | WEIGHT: 79 LBS | DIASTOLIC BLOOD PRESSURE: 72 MMHG | BODY MASS INDEX: 15.92 KG/M2 | TEMPERATURE: 96.2 F | SYSTOLIC BLOOD PRESSURE: 134 MMHG | HEIGHT: 59 IN | HEART RATE: 65 BPM

## 2022-11-16 DIAGNOSIS — J38.3 OTHER DISEASES OF VOCAL CORDS: ICD-10-CM

## 2022-11-16 DIAGNOSIS — Z12.11 ENCOUNTER FOR SCREENING FOR MALIGNANT NEOPLASM OF COLON: ICD-10-CM

## 2022-11-16 DIAGNOSIS — Z23 ENCOUNTER FOR IMMUNIZATION: ICD-10-CM

## 2022-11-16 DIAGNOSIS — Z12.39 ENCOUNTER FOR OTHER SCREENING FOR MALIGNANT NEOPLASM OF BREAST: ICD-10-CM

## 2022-11-16 DIAGNOSIS — Z82.49 FAMILY HISTORY OF ISCHEMIC HEART DISEASE AND OTHER DISEASES OF THE CIRCULATORY SYSTEM: ICD-10-CM

## 2022-11-16 DIAGNOSIS — Z13.820 ENCOUNTER FOR SCREENING FOR OSTEOPOROSIS: ICD-10-CM

## 2022-11-16 PROCEDURE — 93000 ELECTROCARDIOGRAM COMPLETE: CPT

## 2022-11-16 PROCEDURE — G0439: CPT

## 2022-11-16 PROCEDURE — 90732 PPSV23 VACC 2 YRS+ SUBQ/IM: CPT

## 2022-11-16 PROCEDURE — G0009: CPT

## 2022-11-20 PROBLEM — M19.041 ARTHRITIS OF BOTH HANDS: Status: ACTIVE | Noted: 2021-04-27

## 2022-11-20 NOTE — HISTORY OF PRESENT ILLNESS
[FreeTextEntry8] : c/o right sided ear pain x sunday \par feels clogged\par no pain with swallowing \par history of recurrent sinus infections \par seen by ent in the past and would use with mynor's solution \par this would work better for her than antibiotics\par + sinus pressure\par denies any fever or chills\par denies any cough \par denies any other associated symptoms \par denies any other complaints or concerns at this time \par

## 2022-11-20 NOTE — ASSESSMENT
[FreeTextEntry1] : VSS\par paper RX written for compounded Tariq's solution\par medication as prescribed, medication education done \par advised to increase fluid intake, rest, and acetaminophen/ibuprofen prn pain/fever\par follow up in office if sx persists or worsens\par patient verbalizes understanding and is stable upon d/c\par

## 2022-11-20 NOTE — PHYSICAL EXAM
[Normal] : no posterior cervical lymphadenopathy and no anterior cervical lymphadenopathy [de-identified] : + hypertonic nasal turbinates with b/l ethmoid sinus tenderness

## 2022-11-28 PROBLEM — Z12.39 ENCOUNTER FOR SCREENING BREAST EXAMINATION: Status: ACTIVE | Noted: 2022-11-16

## 2022-11-28 PROBLEM — Z23 NEED FOR PNEUMOCOCCAL VACCINE: Status: ACTIVE | Noted: 2021-08-13

## 2022-11-28 PROBLEM — Z12.11 SCREENING FOR COLON CANCER: Status: ACTIVE | Noted: 2022-11-16

## 2022-11-28 PROBLEM — Z13.820 SCREENING FOR OSTEOPOROSIS: Status: ACTIVE | Noted: 2022-11-16

## 2022-11-28 PROBLEM — J38.3 SPASMODIC DYSPHONIA: Status: ACTIVE | Noted: 2021-08-18

## 2022-11-28 NOTE — HEALTH RISK ASSESSMENT
[Very Good] : ~his/her~  mood as very good [Never] : Never [No] : In the past 12 months have you used drugs other than those required for medical reasons? No [No falls in past year] : Patient reported no falls in the past year [0] : 2) Feeling down, depressed, or hopeless: Not at all (0) [PHQ-2 Negative - No further assessment needed] : PHQ-2 Negative - No further assessment needed [HIV test declined] : HIV test declined [Hepatitis C test declined] : Hepatitis C test declined [None] : None [Alone] : lives alone [Fully functional (bathing, dressing, toileting, transferring, walking, feeding)] : Fully functional (bathing, dressing, toileting, transferring, walking, feeding) [Fully functional (using the telephone, shopping, preparing meals, housekeeping, doing laundry, using] : Fully functional and needs no help or supervision to perform IADLs (using the telephone, shopping, preparing meals, housekeeping, doing laundry, using transportation, managing medications and managing finances) [I will adhere to the patient's wishes.] : I will adhere to the patient's wishes. [1 or 2 (0 pts)] : 1 or 2 (0 points) [Never (0 pts)] : Never (0 points) [FreeTextEntry1] : none [de-identified] : none  [de-identified] : derm, eye doctor  [Audit-CScore] : 0 [de-identified] : walking - 3 to 5milles daily, arm and leg exercises  [de-identified] : balanced; supplements - d [VWL6Olmgi] : 0 [Change in mental status noted] : No change in mental status noted [Language] : denies difficulty with language [Sexually Active] : not sexually active [High Risk Behavior] : no high risk behavior [Reports changes in hearing] : Reports no changes in hearing [Reports changes in vision] : Reports no changes in vision [Reports changes in dental health] : Reports no changes in dental health [MammogramDate] : 2018  [MammogramComments] : was not able to due putting weight, u/s  [PapSmearDate] : 202 [BoneDensityComments] : in the 90s  [ColonoscopyComments] : never prior; will refer to cologuard  [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

## 2022-11-28 NOTE — HISTORY OF PRESENT ILLNESS
[FreeTextEntry1] : CPE [de-identified] : - hx of dysphonia \par seen an ent about 30 years  ago \par after 15 to 20 mins vocal cord loosen up after talking \par now not using vocal cords as often because she is seeing less people since the pandemic \par best friend moved away \par no family locally \par taking 3 zoom classes \par speaks to friends a lot via phone\par denies any other associated symptoms\par denies any other complaints or concerns at this time

## 2022-11-28 NOTE — PHYSICAL EXAM
[No Acute Distress] : no acute distress [Well Nourished] : well nourished [Well Developed] : well developed [Well-Appearing] : well-appearing [Normal Sclera/Conjunctiva] : normal sclera/conjunctiva [PERRL] : pupils equal round and reactive to light [EOMI] : extraocular movements intact [Normal Outer Ear/Nose] : the outer ears and nose were normal in appearance [Normal Oropharynx] : the oropharynx was normal [No JVD] : no jugular venous distention [No Lymphadenopathy] : no lymphadenopathy [Supple] : supple [Thyroid Normal, No Nodules] : the thyroid was normal and there were no nodules present [No Respiratory Distress] : no respiratory distress  [No Accessory Muscle Use] : no accessory muscle use [Clear to Auscultation] : lungs were clear to auscultation bilaterally [Normal Rate] : normal rate  [Regular Rhythm] : with a regular rhythm [Normal S1, S2] : normal S1 and S2 [No Murmur] : no murmur heard [No Carotid Bruits] : no carotid bruits [No Abdominal Bruit] : a ~M bruit was not heard ~T in the abdomen [No Varicosities] : no varicosities [Pedal Pulses Present] : the pedal pulses are present [No Edema] : there was no peripheral edema [No Palpable Aorta] : no palpable aorta [No Extremity Clubbing/Cyanosis] : no extremity clubbing/cyanosis [Soft] : abdomen soft [Non Tender] : non-tender [Non-distended] : non-distended [No Masses] : no abdominal mass palpated [No HSM] : no HSM [Normal Bowel Sounds] : normal bowel sounds [Normal Posterior Cervical Nodes] : no posterior cervical lymphadenopathy [Normal Anterior Cervical Nodes] : no anterior cervical lymphadenopathy [No CVA Tenderness] : no CVA  tenderness [No Spinal Tenderness] : no spinal tenderness [No Joint Swelling] : no joint swelling [Grossly Normal Strength/Tone] : grossly normal strength/tone [No Rash] : no rash [Coordination Grossly Intact] : coordination grossly intact [No Focal Deficits] : no focal deficits [Normal Gait] : normal gait [Deep Tendon Reflexes (DTR)] : deep tendon reflexes were 2+ and symmetric [Normal Affect] : the affect was normal [Normal Insight/Judgement] : insight and judgment were intact [FreeTextEntry1] : dysphonia

## 2022-11-28 NOTE — ASSESSMENT
[FreeTextEntry1] : Routine medical examination\par VSS- exam normal \par reviewed labs with patient \par referred as above\par Advised healthy diet and exercise\par for pneumonia vaccine today\par patient verbalizes understanding and patient is stable upon discharge\par

## 2023-01-04 ENCOUNTER — APPOINTMENT (OUTPATIENT)
Dept: NUTRITION | Facility: CLINIC | Age: 78
End: 2023-01-04
Payer: SELF-PAY

## 2023-01-04 ENCOUNTER — APPOINTMENT (OUTPATIENT)
Dept: NUTRITION | Facility: CLINIC | Age: 78
End: 2023-01-04

## 2023-01-04 PROCEDURE — 97802R: CUSTOM | Mod: 95

## 2023-01-18 NOTE — ASU PREOP CHECKLIST - PATIENT PROBLEMS/NEEDS
[Time Spent: ___ minutes] : I have spent [unfilled] minutes of time on the encounter.
Patient expressed no known problems or needs

## 2023-03-30 ENCOUNTER — NON-APPOINTMENT (OUTPATIENT)
Age: 78
End: 2023-03-30

## 2023-03-30 LAB
25(OH)D3 SERPL-MCNC: 42.6 NG/ML
ALBUMIN SERPL ELPH-MCNC: 4.4 G/DL
ALP BLD-CCNC: 95 U/L
ALT SERPL-CCNC: 20 U/L
ANION GAP SERPL CALC-SCNC: 12 MMOL/L
AST SERPL-CCNC: 29 U/L
BASOPHILS # BLD AUTO: 0.04 K/UL
BASOPHILS NFR BLD AUTO: 0.6 %
BILIRUB DIRECT SERPL-MCNC: 0.1 MG/DL
BILIRUB INDIRECT SERPL-MCNC: 0.2 MG/DL
BILIRUB SERPL-MCNC: 0.3 MG/DL
BUN SERPL-MCNC: 23 MG/DL
CALCIUM SERPL-MCNC: 9.9 MG/DL
CHLORIDE SERPL-SCNC: 103 MMOL/L
CHOLEST SERPL-MCNC: 169 MG/DL
CO2 SERPL-SCNC: 26 MMOL/L
CREAT SERPL-MCNC: 0.89 MG/DL
EGFR: 68 ML/MIN/1.73M2
EOSINOPHIL # BLD AUTO: 0.07 K/UL
EOSINOPHIL NFR BLD AUTO: 1.1 %
ESTIMATED AVERAGE GLUCOSE: 120 MG/DL
FOLATE SERPL-MCNC: 16.9 NG/ML
GLUCOSE SERPL-MCNC: 90 MG/DL
HBA1C MFR BLD HPLC: 5.8 %
HCT VFR BLD CALC: 39.9 %
HDLC SERPL-MCNC: 77 MG/DL
HGB BLD-MCNC: 12.9 G/DL
IMM GRANULOCYTES NFR BLD AUTO: 0.2 %
IRON SERPL-MCNC: 91 UG/DL
LDLC SERPL CALC-MCNC: 76 MG/DL
LYMPHOCYTES # BLD AUTO: 2 K/UL
LYMPHOCYTES NFR BLD AUTO: 30.1 %
MAN DIFF?: NORMAL
MCHC RBC-ENTMCNC: 29.7 PG
MCHC RBC-ENTMCNC: 32.3 GM/DL
MCV RBC AUTO: 91.9 FL
MONOCYTES # BLD AUTO: 0.42 K/UL
MONOCYTES NFR BLD AUTO: 6.3 %
NEUTROPHILS # BLD AUTO: 4.11 K/UL
NEUTROPHILS NFR BLD AUTO: 61.7 %
NONHDLC SERPL-MCNC: 91 MG/DL
PLATELET # BLD AUTO: 189 K/UL
POTASSIUM SERPL-SCNC: 4.5 MMOL/L
PROT SERPL-MCNC: 6.8 G/DL
RBC # BLD: 4.34 M/UL
RBC # FLD: 12.9 %
SODIUM SERPL-SCNC: 140 MMOL/L
T4 FREE SERPL-MCNC: 1.1 NG/DL
TRIGL SERPL-MCNC: 79 MG/DL
TSH SERPL-ACNC: 1.32 UIU/ML
VIT B12 SERPL-MCNC: 1422 PG/ML
WBC # FLD AUTO: 6.65 K/UL

## 2023-04-19 ENCOUNTER — APPOINTMENT (OUTPATIENT)
Dept: GASTROENTEROLOGY | Facility: CLINIC | Age: 78
End: 2023-04-19
Payer: MEDICARE

## 2023-04-19 VITALS
WEIGHT: 79 LBS | HEART RATE: 65 BPM | HEIGHT: 59 IN | OXYGEN SATURATION: 99 % | SYSTOLIC BLOOD PRESSURE: 134 MMHG | DIASTOLIC BLOOD PRESSURE: 88 MMHG | TEMPERATURE: 97.3 F | BODY MASS INDEX: 15.92 KG/M2

## 2023-04-19 DIAGNOSIS — K52.9 NONINFECTIVE GASTROENTERITIS AND COLITIS, UNSPECIFIED: ICD-10-CM

## 2023-04-19 PROCEDURE — 99214 OFFICE O/P EST MOD 30 MIN: CPT

## 2023-04-19 RX ORDER — CHOLESTYRAMINE 4 G/9G
4 POWDER, FOR SUSPENSION ORAL DAILY
Qty: 180 | Refills: 0 | Status: ACTIVE | COMMUNITY
Start: 2023-04-19 | End: 1900-01-01

## 2023-04-19 NOTE — HISTORY OF PRESENT ILLNESS
[FreeTextEntry1] : 77F, former patient of Dr James, Here today to establish care. \par She reports a hx of IBS diagnosed by her prior GIs and PCP.\par Reports symptoms of abdominal discomfort (lower abd) and gas that usually resolves after a bowel movement. \par Bowel movements are usually normal, she has diarrhea few times a year for a few days. \par She is s/p ccy, and was rx'd colestipol in the past for diarrhea and feels that it is very helpful. \par She is requesting refill of medications . \par \par Denies hematocehzia, melena, n/v, weight loss. \par Wt is stable at 79lbs / BMI 15.9. \par She is following with a nutritionist. \par \par No prior egd/colon. \par No fhx of GI malignancy. \par Reports having done cologuard in the past w her pcp, these were all negative per patient. ( no report available). \par \par \par

## 2023-04-19 NOTE — ASSESSMENT
[FreeTextEntry1] : 77F, former patient of Dr James, hx of IBS, here today to establish care. No prior egd/colon. Reprotedly negative cologuard testing in the past ( no report available). No fhx of GI malignancy. \par \par # IBS \par - primary symptoms of episodic bloating, discomfort, loose stools\par - in the past has responded to  colestipol. Will rx cholestyramine for patient for diarrhea\par - trial of fiber supplement \par - increas soluble fiber in diet \par - f/u w nutritionist \par \par # Colon cancer screening\par - Long discussion w patient \par - she does not want to pursue any testing or screenign at this time. R/B/A reviewed in detail including delay in dx of malignancy \par \par RTC 6 mos

## 2023-10-23 ENCOUNTER — APPOINTMENT (OUTPATIENT)
Dept: GASTROENTEROLOGY | Facility: CLINIC | Age: 78
End: 2023-10-23

## 2023-10-25 DIAGNOSIS — R35.0 FREQUENCY OF MICTURITION: ICD-10-CM

## 2023-11-14 ENCOUNTER — APPOINTMENT (OUTPATIENT)
Dept: FAMILY MEDICINE | Facility: CLINIC | Age: 78
End: 2023-11-14
Payer: MEDICARE

## 2023-11-14 PROCEDURE — 36415 COLL VENOUS BLD VENIPUNCTURE: CPT

## 2023-11-21 ENCOUNTER — APPOINTMENT (OUTPATIENT)
Dept: FAMILY MEDICINE | Facility: CLINIC | Age: 78
End: 2023-11-21
Payer: MEDICARE

## 2023-11-21 VITALS
WEIGHT: 85 LBS | TEMPERATURE: 96.8 F | HEIGHT: 59 IN | BODY MASS INDEX: 17.14 KG/M2 | DIASTOLIC BLOOD PRESSURE: 70 MMHG | SYSTOLIC BLOOD PRESSURE: 126 MMHG | OXYGEN SATURATION: 99 % | HEART RATE: 78 BPM | RESPIRATION RATE: 16 BRPM

## 2023-11-21 DIAGNOSIS — R73.03 PREDIABETES.: ICD-10-CM

## 2023-11-21 DIAGNOSIS — Z12.39 ENCOUNTER FOR OTHER SCREENING FOR MALIGNANT NEOPLASM OF BREAST: ICD-10-CM

## 2023-11-21 DIAGNOSIS — Z00.00 ENCOUNTER FOR GENERAL ADULT MEDICAL EXAMINATION W/OUT ABNORMAL FINDINGS: ICD-10-CM

## 2023-11-21 DIAGNOSIS — K58.9 IRRITABLE BOWEL SYNDROME W/OUT DIARRHEA: ICD-10-CM

## 2023-11-21 DIAGNOSIS — R14.0 ABDOMINAL DISTENSION (GASEOUS): ICD-10-CM

## 2023-11-21 PROCEDURE — G0439: CPT

## 2023-11-29 LAB
25(OH)D3 SERPL-MCNC: 32.6 NG/ML
ALBUMIN SERPL ELPH-MCNC: 4.5 G/DL
ALP BLD-CCNC: 107 U/L
ALT SERPL-CCNC: 25 U/L
ANION GAP SERPL CALC-SCNC: 11 MMOL/L
APPEARANCE: CLEAR
AST SERPL-CCNC: 29 U/L
BACTERIA: NEGATIVE /HPF
BASOPHILS # BLD AUTO: 0.04 K/UL
BASOPHILS NFR BLD AUTO: 0.7 %
BILIRUB DIRECT SERPL-MCNC: 0.2 MG/DL
BILIRUB INDIRECT SERPL-MCNC: 0.4 MG/DL
BILIRUB SERPL-MCNC: 0.5 MG/DL
BILIRUBIN URINE: NEGATIVE
BLOOD URINE: ABNORMAL
BUN SERPL-MCNC: 28 MG/DL
CALCIUM OXALATE CRYSTALS: PRESENT
CALCIUM SERPL-MCNC: 10.2 MG/DL
CAST: 0 /LPF
CHLORIDE SERPL-SCNC: 104 MMOL/L
CHOLEST SERPL-MCNC: 182 MG/DL
CO2 SERPL-SCNC: 25 MMOL/L
COLOR: YELLOW
CREAT SERPL-MCNC: 0.89 MG/DL
EGFR: 67 ML/MIN/1.73M2
EOSINOPHIL # BLD AUTO: 0.08 K/UL
EOSINOPHIL NFR BLD AUTO: 1.3 %
EPITHELIAL CELLS: 1 /HPF
ESTIMATED AVERAGE GLUCOSE: 117 MG/DL
FOLATE SERPL-MCNC: >20 NG/ML
GLUCOSE QUALITATIVE U: NEGATIVE MG/DL
GLUCOSE SERPL-MCNC: 98 MG/DL
HBA1C MFR BLD HPLC: 5.7 %
HCT VFR BLD CALC: 45.4 %
HDLC SERPL-MCNC: 93 MG/DL
HGB BLD-MCNC: 14.3 G/DL
IMM GRANULOCYTES NFR BLD AUTO: 0.2 %
IRON SERPL-MCNC: 118 UG/DL
KETONES URINE: NEGATIVE MG/DL
LDLC SERPL CALC-MCNC: 76 MG/DL
LEUKOCYTE ESTERASE URINE: NEGATIVE
LYMPHOCYTES # BLD AUTO: 1.83 K/UL
LYMPHOCYTES NFR BLD AUTO: 30.8 %
MAN DIFF?: NORMAL
MCHC RBC-ENTMCNC: 29.9 PG
MCHC RBC-ENTMCNC: 31.5 GM/DL
MCV RBC AUTO: 94.8 FL
MICROSCOPIC-UA: NORMAL
MONOCYTES # BLD AUTO: 0.44 K/UL
MONOCYTES NFR BLD AUTO: 7.4 %
NEUTROPHILS # BLD AUTO: 3.55 K/UL
NEUTROPHILS NFR BLD AUTO: 59.6 %
NITRITE URINE: NEGATIVE
NONHDLC SERPL-MCNC: 89 MG/DL
PH URINE: 6
PLATELET # BLD AUTO: 216 K/UL
POTASSIUM SERPL-SCNC: 5.5 MMOL/L
PROT SERPL-MCNC: 7 G/DL
PROTEIN URINE: NEGATIVE MG/DL
RBC # BLD: 4.79 M/UL
RBC # FLD: 13.2 %
RED BLOOD CELLS URINE: 1 /HPF
REVIEW: NORMAL
SODIUM SERPL-SCNC: 140 MMOL/L
SPECIFIC GRAVITY URINE: 1.02
T4 FREE SERPL-MCNC: 1.2 NG/DL
TRIGL SERPL-MCNC: 73 MG/DL
TSH SERPL-ACNC: 1.29 UIU/ML
UROBILINOGEN URINE: 0.2 MG/DL
VIT B12 SERPL-MCNC: 706 PG/ML
WBC # FLD AUTO: 5.95 K/UL
WHITE BLOOD CELLS URINE: 0 /HPF

## 2023-12-05 ENCOUNTER — APPOINTMENT (OUTPATIENT)
Dept: GASTROENTEROLOGY | Facility: CLINIC | Age: 78
End: 2023-12-05

## 2024-01-31 LAB — NONINV COLON CA DNA+OCC BLD SCRN STL QL: NEGATIVE

## 2024-05-02 ENCOUNTER — APPOINTMENT (OUTPATIENT)
Dept: FAMILY MEDICINE | Facility: CLINIC | Age: 79
End: 2024-05-02
Payer: MEDICARE

## 2024-05-02 VITALS
RESPIRATION RATE: 18 BRPM | HEIGHT: 59 IN | SYSTOLIC BLOOD PRESSURE: 106 MMHG | HEART RATE: 45 BPM | OXYGEN SATURATION: 99 % | BODY MASS INDEX: 16.53 KG/M2 | TEMPERATURE: 97.6 F | DIASTOLIC BLOOD PRESSURE: 64 MMHG | WEIGHT: 82 LBS

## 2024-05-02 DIAGNOSIS — R12 HEARTBURN: ICD-10-CM

## 2024-05-02 DIAGNOSIS — H61.23 IMPACTED CERUMEN, BILATERAL: ICD-10-CM

## 2024-05-02 PROCEDURE — 99214 OFFICE O/P EST MOD 30 MIN: CPT

## 2024-05-02 PROCEDURE — G2211 COMPLEX E/M VISIT ADD ON: CPT

## 2024-05-04 LAB — BACTERIA THROAT CULT: NORMAL

## 2024-05-07 DIAGNOSIS — R09.81 NASAL CONGESTION: ICD-10-CM

## 2024-05-07 DIAGNOSIS — R09.82 POSTNASAL DRIP: ICD-10-CM

## 2024-05-07 RX ORDER — AMOXICILLIN AND CLAVULANATE POTASSIUM 500; 125 MG/1; MG/1
500-125 TABLET, FILM COATED ORAL
Qty: 14 | Refills: 0 | Status: ACTIVE | COMMUNITY
Start: 2024-05-07 | End: 1900-01-01

## 2024-05-09 RX ORDER — AZELASTINE HYDROCHLORIDE 137 UG/1
137 SPRAY, METERED NASAL TWICE DAILY
Qty: 1 | Refills: 1 | Status: ACTIVE | COMMUNITY
Start: 2024-05-02

## 2024-05-09 NOTE — PHYSICAL EXAM
[Normal] : no posterior cervical lymphadenopathy and no anterior cervical lymphadenopathy [de-identified] : + post nasal drip, + ethmoid sinus pressure

## 2024-05-09 NOTE — HISTORY OF PRESENT ILLNESS
[FreeTextEntry8] : 77 yo f, pmhx of spasmodic dysphonia, IBS, c/o sore throat + sinus pressure on right side  + post nasal drip  constant post nasal drip  able to eat, denies any nausea or vomiting denies any cough, fever or chills  only taking allergy medication if needed- taking children's loratadine  sometimes has some heartburn symptoms  seen by ent pre pandemic, who has since retired  denies any other associated symptoms denies any other complaints or concerns at this time

## 2024-05-09 NOTE — ASSESSMENT
[FreeTextEntry1] : VSS  symptoms likely due to post nasal drip which can be contributed from allergies vs reflux consider treating for acute sinusitis if symptoms persists  medication as prescribed, medication education done  referred to ENT if symptoms persists  follow up in office if sx persists or worsens patient verbalizes understanding and is stable upon d/c

## 2024-06-03 ENCOUNTER — APPOINTMENT (OUTPATIENT)
Dept: OTOLARYNGOLOGY | Facility: CLINIC | Age: 79
End: 2024-06-03

## 2025-04-23 ENCOUNTER — APPOINTMENT (OUTPATIENT)
Dept: GASTROENTEROLOGY | Facility: CLINIC | Age: 80
End: 2025-04-23
Payer: MEDICARE

## 2025-04-23 VITALS
WEIGHT: 78 LBS | DIASTOLIC BLOOD PRESSURE: 65 MMHG | OXYGEN SATURATION: 99 % | SYSTOLIC BLOOD PRESSURE: 125 MMHG | HEIGHT: 59 IN | HEART RATE: 67 BPM | BODY MASS INDEX: 15.72 KG/M2

## 2025-04-23 DIAGNOSIS — K58.9 IRRITABLE BOWEL SYNDROME, UNSPECIFIED: ICD-10-CM

## 2025-04-23 DIAGNOSIS — R10.9 UNSPECIFIED ABDOMINAL PAIN: ICD-10-CM

## 2025-04-23 DIAGNOSIS — R14.0 ABDOMINAL DISTENSION (GASEOUS): ICD-10-CM

## 2025-04-23 PROCEDURE — 99214 OFFICE O/P EST MOD 30 MIN: CPT

## 2025-04-23 RX ORDER — FAMOTIDINE 20 MG/1
20 TABLET, FILM COATED ORAL DAILY
Qty: 30 | Refills: 2 | Status: ACTIVE | COMMUNITY
Start: 2025-04-23 | End: 1900-01-01

## 2025-04-28 ENCOUNTER — APPOINTMENT (OUTPATIENT)
Dept: GASTROENTEROLOGY | Facility: CLINIC | Age: 80
End: 2025-04-28

## 2025-04-30 ENCOUNTER — RESULT REVIEW (OUTPATIENT)
Age: 80
End: 2025-04-30

## 2025-05-02 ENCOUNTER — APPOINTMENT (OUTPATIENT)
Dept: CT IMAGING | Facility: CLINIC | Age: 80
End: 2025-05-02
Payer: MEDICARE

## 2025-05-02 ENCOUNTER — OUTPATIENT (OUTPATIENT)
Dept: OUTPATIENT SERVICES | Facility: HOSPITAL | Age: 80
LOS: 1 days | End: 2025-05-02
Payer: MEDICARE

## 2025-05-02 DIAGNOSIS — Z87.81 PERSONAL HISTORY OF (HEALED) TRAUMATIC FRACTURE: Chronic | ICD-10-CM

## 2025-05-02 DIAGNOSIS — Z98.890 OTHER SPECIFIED POSTPROCEDURAL STATES: Chronic | ICD-10-CM

## 2025-05-02 DIAGNOSIS — R10.9 UNSPECIFIED ABDOMINAL PAIN: ICD-10-CM

## 2025-05-02 DIAGNOSIS — Z98.89 OTHER SPECIFIED POSTPROCEDURAL STATES: Chronic | ICD-10-CM

## 2025-05-02 DIAGNOSIS — Z90.79 ACQUIRED ABSENCE OF OTHER GENITAL ORGAN(S): Chronic | ICD-10-CM

## 2025-05-02 DIAGNOSIS — R14.0 ABDOMINAL DISTENSION (GASEOUS): ICD-10-CM

## 2025-05-02 PROCEDURE — 74177 CT ABD & PELVIS W/CONTRAST: CPT | Mod: 26

## 2025-05-02 PROCEDURE — 74177 CT ABD & PELVIS W/CONTRAST: CPT

## 2025-05-08 ENCOUNTER — APPOINTMENT (OUTPATIENT)
Dept: FAMILY MEDICINE | Facility: CLINIC | Age: 80
End: 2025-05-08

## 2025-05-08 VITALS
HEART RATE: 71 BPM | TEMPERATURE: 97 F | RESPIRATION RATE: 16 BRPM | DIASTOLIC BLOOD PRESSURE: 80 MMHG | HEIGHT: 59 IN | SYSTOLIC BLOOD PRESSURE: 134 MMHG | OXYGEN SATURATION: 99 %

## 2025-05-08 DIAGNOSIS — K21.9 GASTRO-ESOPHAGEAL REFLUX DISEASE W/OUT ESOPHAGITIS: ICD-10-CM

## 2025-05-08 DIAGNOSIS — R10.9 UNSPECIFIED ABDOMINAL PAIN: ICD-10-CM

## 2025-05-08 PROCEDURE — G2211 COMPLEX E/M VISIT ADD ON: CPT

## 2025-05-08 PROCEDURE — 99214 OFFICE O/P EST MOD 30 MIN: CPT

## 2025-05-08 RX ORDER — PANTOPRAZOLE 40 MG/1
40 TABLET, DELAYED RELEASE ORAL
Qty: 14 | Refills: 1 | Status: ACTIVE | COMMUNITY
Start: 2025-05-08 | End: 1900-01-01

## 2025-05-08 RX ORDER — SODIUM SULFATE, POTASSIUM SULFATE AND MAGNESIUM SULFATE 1.6; 3.13; 17.5 G/177ML; G/177ML; G/177ML
17.5-3.13-1.6 SOLUTION ORAL
Qty: 1 | Refills: 0 | Status: ACTIVE | COMMUNITY
Start: 2025-05-08 | End: 1900-01-01

## 2025-05-09 ENCOUNTER — NON-APPOINTMENT (OUTPATIENT)
Age: 80
End: 2025-05-09

## 2025-05-09 LAB
APPEARANCE: CLEAR
BACTERIA: NEGATIVE /HPF
BILIRUBIN URINE: NEGATIVE
BLOOD URINE: ABNORMAL
CAST: 0 /LPF
COLOR: YELLOW
EPITHELIAL CELLS: 0 /HPF
GLUCOSE QUALITATIVE U: NEGATIVE MG/DL
KETONES URINE: NEGATIVE MG/DL
LEUKOCYTE ESTERASE URINE: NEGATIVE
MICROSCOPIC-UA: NORMAL
NITRITE URINE: NEGATIVE
PH URINE: 6
PROTEIN URINE: NEGATIVE MG/DL
RED BLOOD CELLS URINE: 0 /HPF
SPECIFIC GRAVITY URINE: 1.01
UROBILINOGEN URINE: 0.2 MG/DL
WHITE BLOOD CELLS URINE: 0 /HPF

## 2025-05-10 LAB — BACTERIA UR CULT: NORMAL

## 2025-05-27 ENCOUNTER — OUTPATIENT (OUTPATIENT)
Dept: OUTPATIENT SERVICES | Facility: HOSPITAL | Age: 80
LOS: 1 days | End: 2025-05-27
Payer: MEDICARE

## 2025-05-27 VITALS
OXYGEN SATURATION: 99 % | TEMPERATURE: 98 F | HEIGHT: 59 IN | WEIGHT: 78.93 LBS | HEART RATE: 72 BPM | DIASTOLIC BLOOD PRESSURE: 90 MMHG | SYSTOLIC BLOOD PRESSURE: 150 MMHG | RESPIRATION RATE: 16 BRPM

## 2025-05-27 DIAGNOSIS — Z90.79 ACQUIRED ABSENCE OF OTHER GENITAL ORGAN(S): Chronic | ICD-10-CM

## 2025-05-27 DIAGNOSIS — K52.9 NONINFECTIVE GASTROENTERITIS AND COLITIS, UNSPECIFIED: ICD-10-CM

## 2025-05-27 DIAGNOSIS — Z90.89 ACQUIRED ABSENCE OF OTHER ORGANS: Chronic | ICD-10-CM

## 2025-05-27 DIAGNOSIS — Z98.89 OTHER SPECIFIED POSTPROCEDURAL STATES: Chronic | ICD-10-CM

## 2025-05-27 DIAGNOSIS — Z90.49 ACQUIRED ABSENCE OF OTHER SPECIFIED PARTS OF DIGESTIVE TRACT: Chronic | ICD-10-CM

## 2025-05-27 DIAGNOSIS — K21.9 GASTRO-ESOPHAGEAL REFLUX DISEASE WITHOUT ESOPHAGITIS: ICD-10-CM

## 2025-05-27 DIAGNOSIS — Z01.818 ENCOUNTER FOR OTHER PREPROCEDURAL EXAMINATION: ICD-10-CM

## 2025-05-27 DIAGNOSIS — Z98.890 OTHER SPECIFIED POSTPROCEDURAL STATES: Chronic | ICD-10-CM

## 2025-05-27 DIAGNOSIS — Z87.81 PERSONAL HISTORY OF (HEALED) TRAUMATIC FRACTURE: Chronic | ICD-10-CM

## 2025-05-27 DIAGNOSIS — J38.3 OTHER DISEASES OF VOCAL CORDS: ICD-10-CM

## 2025-05-27 LAB
ANION GAP SERPL CALC-SCNC: 13 MMOL/L — SIGNIFICANT CHANGE UP (ref 5–17)
BUN SERPL-MCNC: 27 MG/DL — HIGH (ref 7–23)
CALCIUM SERPL-MCNC: 10.4 MG/DL — SIGNIFICANT CHANGE UP (ref 8.4–10.5)
CHLORIDE SERPL-SCNC: 103 MMOL/L — SIGNIFICANT CHANGE UP (ref 96–108)
CO2 SERPL-SCNC: 25 MMOL/L — SIGNIFICANT CHANGE UP (ref 22–31)
CREAT SERPL-MCNC: 0.85 MG/DL — SIGNIFICANT CHANGE UP (ref 0.5–1.3)
EGFR: 71 ML/MIN/1.73M2 — SIGNIFICANT CHANGE UP
EGFR: 71 ML/MIN/1.73M2 — SIGNIFICANT CHANGE UP
GLUCOSE SERPL-MCNC: 72 MG/DL — SIGNIFICANT CHANGE UP (ref 70–99)
HCT VFR BLD CALC: 42.5 % — SIGNIFICANT CHANGE UP (ref 34.5–45)
HGB BLD-MCNC: 13.9 G/DL — SIGNIFICANT CHANGE UP (ref 11.5–15.5)
MCHC RBC-ENTMCNC: 30.3 PG — SIGNIFICANT CHANGE UP (ref 27–34)
MCHC RBC-ENTMCNC: 32.7 G/DL — SIGNIFICANT CHANGE UP (ref 32–36)
MCV RBC AUTO: 92.8 FL — SIGNIFICANT CHANGE UP (ref 80–100)
NRBC BLD AUTO-RTO: 0 /100 WBCS — SIGNIFICANT CHANGE UP (ref 0–0)
PLATELET # BLD AUTO: 201 K/UL — SIGNIFICANT CHANGE UP (ref 150–400)
POTASSIUM SERPL-MCNC: 4.4 MMOL/L — SIGNIFICANT CHANGE UP (ref 3.5–5.3)
POTASSIUM SERPL-SCNC: 4.4 MMOL/L — SIGNIFICANT CHANGE UP (ref 3.5–5.3)
RBC # BLD: 4.58 M/UL — SIGNIFICANT CHANGE UP (ref 3.8–5.2)
RBC # FLD: 13 % — SIGNIFICANT CHANGE UP (ref 10.3–14.5)
SODIUM SERPL-SCNC: 141 MMOL/L — SIGNIFICANT CHANGE UP (ref 135–145)
WBC # BLD: 6.6 K/UL — SIGNIFICANT CHANGE UP (ref 3.8–10.5)
WBC # FLD AUTO: 6.6 K/UL — SIGNIFICANT CHANGE UP (ref 3.8–10.5)

## 2025-05-27 PROCEDURE — G0463: CPT

## 2025-05-27 PROCEDURE — 85027 COMPLETE CBC AUTOMATED: CPT

## 2025-05-27 PROCEDURE — 80048 BASIC METABOLIC PNL TOTAL CA: CPT

## 2025-05-27 NOTE — H&P PST ADULT - NSICDXPASTMEDICALHX_GEN_ALL_CORE_FT
PAST MEDICAL HISTORY:  Arthritis     GERD (gastroesophageal reflux disease)     Hiatal hernia     IBS (irritable bowel syndrome)     MVP (mitral valve prolapse)     Scoliosis     Spasmodic dysphonia      PAST MEDICAL HISTORY:  Arthritis     GERD (gastroesophageal reflux disease)     H/O kyphosis     Hiatal hernia     IBS (irritable bowel syndrome)     MVP (mitral valve prolapse)     Scoliosis     Spasmodic dysphonia

## 2025-05-27 NOTE — H&P PST ADULT - NSICDXPASTSURGICALHX_GEN_ALL_CORE_FT
PAST SURGICAL HISTORY:  H/O removal of cyst left axilla    H/O unilateral salpingectomy right    History of appendectomy left; repair    History of fracture of humerus     S/P laparoscopic cholecystectomy     S/P tonsillectomy

## 2025-05-27 NOTE — H&P PST ADULT - PROBLEM SELECTOR PLAN 2
will obtain last ENT eval will obtain last ENT eval  Case Discussed with Dr. Nguyen via email 5/27/2025

## 2025-05-27 NOTE — H&P PST ADULT - ALCOHOL USE HISTORY SINGLE SELECT
Cough: Care Instructions  Your Care Instructions    A cough is your body's response to something that bothers your throat or airways. Many things can cause a cough. You might cough because of a cold or the flu, bronchitis, or asthma. Smoking, postnasal drip, allergies, and stomach acid that backs up into your throat also can cause coughs. A cough is a symptom, not a disease. Most coughs stop when the cause, such as a cold, goes away. You can take a few steps at home to cough less and feel better. Follow-up care is a key part of your treatment and safety. Be sure to make and go to all appointments, and call your doctor if you are having problems. It's also a good idea to know your test results and keep a list of the medicines you take. How can you care for yourself at home? · Drink lots of water and other fluids. This helps thin the mucus and soothes a dry or sore throat. Honey or lemon juice in hot water or tea may ease a dry cough. · Take cough medicine as directed by your doctor. · Prop up your head on pillows to help you breathe and ease a dry cough. · Try cough drops to soothe a dry or sore throat. Cough drops don't stop a cough. Medicine-flavored cough drops are no better than candy-flavored drops or hard candy. · Do not smoke. Avoid secondhand smoke. If you need help quitting, talk to your doctor about stop-smoking programs and medicines. These can increase your chances of quitting for good. When should you call for help? Call 911 anytime you think you may need emergency care. For example, call if:  ? · You have severe trouble breathing. ?Call your doctor now or seek immediate medical care if:  ? · You cough up blood. ? · You have new or worse trouble breathing. ? · You have a new or higher fever. ? · You have a new rash. ? Watch closely for changes in your health, and be sure to contact your doctor if:  ? · You cough more deeply or more often, especially if you notice more mucus or a change in the color of your mucus. ? · You have new symptoms, such as a sore throat, an earache, or sinus pain. ? · You do not get better as expected. Where can you learn more? Go to http://irish-srini.info/. Enter D279 in the search box to learn more about \"Cough: Care Instructions. \"  Current as of: May 12, 2017  Content Version: 11.4  © 6562-1703 [employer]. Care instructions adapted under license by [employer] (which disclaims liability or warranty for this information). If you have questions about a medical condition or this instruction, always ask your healthcare professional. [place] [address] any warranty or liability for your use of this information. Laryngitis: Care Instructions  Your Care Instructions    Laryngitis is an inflammation of the voice box (larynx) that causes your voice to become raspy or hoarse. It can be short-lived or long-lasting. Most of the time, laryngitis comes on quickly and lasts as long as 2 weeks. It is caused by overuse, irritation, or infection of the vocal cords inside the larynx. Some of the most common causes are a cold, the flu, or allergies. Loud talking, shouting, cheering, or singing also can cause laryngitis. Stomach acid that backs up into the throat also can make you lose your voice. Resting your voice and taking other steps at home can help you get your voice back. Follow-up care is a key part of your treatment and safety. Be sure to make and go to all appointments, and call your doctor if you are having problems. It's also a good idea to know your test results and keep a list of the medicines you take. How can you care for yourself at home? · Follow your doctor's directions for treating the condition that caused you to lose your voice. If your doctor prescribed antibiotics, take them as directed. Do not stop taking them just because you feel better.  You need to take the full course of antibiotics. · Before you use cough and cold medicines, check the label. They may not be safe for young children or for people with certain health problems. · Try to keep stomach acid from backing up into your throat. Do not eat just before bedtime. Reduce the amount of coffee and alcohol you drink, and eat healthy foods. Taking over-the-counter acid reducers can help when these steps are not enough. In some cases, you may need prescription medicine. · Rest your voice. You do not have to stop speaking, but use your voice as little as possible. Speak softly but do not whisper; whispering can bother your larynx more than speaking softly. Avoid talking on the telephone or trying to speak loudly. · Try not to clear your throat. This can cause more irritation of your larynx. Take an over-the-counter cough suppressant (if your doctor recommends it) if you have a dry cough that does not produce mucus. · Do not smoke or allow others to smoke around you. If you need help quitting, talk to your doctor about stop-smoking programs and medicines. These can increase your chances of quitting for good. · Use a humidifier or vaporizer to add moisture to your bedroom. Humidity helps to thin the mucus in the nasal membranes that causes stuffiness or postnasal drip. Follow the directions for cleaning the machine. · Drink plenty of fluids, enough so that your urine is light yellow or clear like water. If you have kidney, heart, or liver disease and have to limit fluids, talk with your doctor before you increase the amount of fluids you drink. · Use saline (saltwater) nasal washes to help keep your nasal passages open and wash out mucus and bacteria. You can buy saline nose drops at a grocery store or drugstore. Or, you can make your own at home by mixing ½ teaspoon salt, 1 cup water (at room temperature), and ½ teaspoon baking soda.  If you make your own, fill a bulb syringe with the solution, insert the tip into your nostril, and squeeze gently. Andrews Palenciaffer your nose. When should you call for help? Call 911 anytime you think you may need emergency care. For example, call if:  ? · You have trouble breathing. ?Call your doctor now or seek immediate medical care if:  ? · You have new or worse pain. ? · You have trouble swallowing. ? Watch closely for changes in your health, and be sure to contact your doctor if:  ? · You do not get better as expected. Where can you learn more? Go to http://irish-srini.info/. Enter L132 in the search box to learn more about \"Laryngitis: Care Instructions. \"  Current as of: May 12, 2017  Content Version: 11.4  © 5814-2379 [employer]. Care instructions adapted under license by [employer] (which disclaims liability or warranty for this information). If you have questions about a medical condition or this instruction, always ask your healthcare professional. [place] [address] any warranty or liability for your use of this information. never

## 2025-05-27 NOTE — H&P PST ADULT - OTHER CARE PROVIDERS
Julien Floyd MD - ENT  [phone] last visit 3/2025 Julien Floyd MD - ENT  [phone] last visit 3/2025, [facility] [phone] Julien Floyd MD - ENT  [phone] last visit probably 9/2024 for spastic dysphonia , [facility] [id number] [phone] last visit 3/2025 for ear troubles

## 2025-05-27 NOTE — H&P PST ADULT - ASSESSMENT
DASI score: 6.8   DASIactivity: walking 5 miles daily, house chores without cp/SOB  loose teeth or dentures: denies   airway mp2     DASI score: 6.8   DASIactivity: walking 5 miles daily, stairs, house chores without cp/SOB  loose teeth or dentures: denies   airway mp2

## 2025-05-27 NOTE — H&P PST ADULT - HISTORY OF PRESENT ILLNESS
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

## 2025-06-03 ENCOUNTER — OUTPATIENT (OUTPATIENT)
Dept: OUTPATIENT SERVICES | Facility: HOSPITAL | Age: 80
LOS: 1 days | End: 2025-06-03
Payer: MEDICARE

## 2025-06-03 ENCOUNTER — APPOINTMENT (OUTPATIENT)
Dept: GASTROENTEROLOGY | Facility: HOSPITAL | Age: 80
End: 2025-06-03

## 2025-06-03 ENCOUNTER — RESULT REVIEW (OUTPATIENT)
Age: 80
End: 2025-06-03

## 2025-06-03 ENCOUNTER — TRANSCRIPTION ENCOUNTER (OUTPATIENT)
Age: 80
End: 2025-06-03

## 2025-06-03 VITALS
WEIGHT: 80.03 LBS | TEMPERATURE: 98 F | RESPIRATION RATE: 15 BRPM | OXYGEN SATURATION: 100 % | HEIGHT: 59 IN | SYSTOLIC BLOOD PRESSURE: 164 MMHG | DIASTOLIC BLOOD PRESSURE: 96 MMHG | HEART RATE: 55 BPM

## 2025-06-03 VITALS
SYSTOLIC BLOOD PRESSURE: 127 MMHG | HEART RATE: 62 BPM | OXYGEN SATURATION: 100 % | DIASTOLIC BLOOD PRESSURE: 63 MMHG | RESPIRATION RATE: 20 BRPM

## 2025-06-03 DIAGNOSIS — Z90.79 ACQUIRED ABSENCE OF OTHER GENITAL ORGAN(S): Chronic | ICD-10-CM

## 2025-06-03 DIAGNOSIS — Z87.81 PERSONAL HISTORY OF (HEALED) TRAUMATIC FRACTURE: Chronic | ICD-10-CM

## 2025-06-03 DIAGNOSIS — Z98.89 OTHER SPECIFIED POSTPROCEDURAL STATES: Chronic | ICD-10-CM

## 2025-06-03 DIAGNOSIS — Z90.49 ACQUIRED ABSENCE OF OTHER SPECIFIED PARTS OF DIGESTIVE TRACT: Chronic | ICD-10-CM

## 2025-06-03 DIAGNOSIS — K52.9 NONINFECTIVE GASTROENTERITIS AND COLITIS, UNSPECIFIED: ICD-10-CM

## 2025-06-03 DIAGNOSIS — Z98.890 OTHER SPECIFIED POSTPROCEDURAL STATES: Chronic | ICD-10-CM

## 2025-06-03 DIAGNOSIS — Z90.89 ACQUIRED ABSENCE OF OTHER ORGANS: Chronic | ICD-10-CM

## 2025-06-03 PROCEDURE — 88305 TISSUE EXAM BY PATHOLOGIST: CPT

## 2025-06-03 PROCEDURE — 88305 TISSUE EXAM BY PATHOLOGIST: CPT | Mod: 26

## 2025-06-03 PROCEDURE — 43239 EGD BIOPSY SINGLE/MULTIPLE: CPT

## 2025-06-03 RX ADMIN — Medication 30 MILLILITER(S): at 13:24

## 2025-06-03 NOTE — ASU PATIENT PROFILE, ADULT - NSICDXPASTMEDICALHX_GEN_ALL_CORE_FT
PAST MEDICAL HISTORY:  Arthritis     GERD (gastroesophageal reflux disease)     H/O kyphosis     Hiatal hernia     IBS (irritable bowel syndrome)     MVP (mitral valve prolapse)     Scoliosis     Spasmodic dysphonia

## 2025-06-03 NOTE — ASU PATIENT PROFILE, ADULT - FALL HARM RISK - HARM RISK INTERVENTIONS

## 2025-06-03 NOTE — ASU DISCHARGE PLAN (ADULT/PEDIATRIC) - NSDISCH_FEVER_ENDO_ALL_CORE_FT
Please call your doctor if you have fever or chills within the next 48 hours.
(1) More than 48 hours/None

## 2025-06-03 NOTE — PRE PROCEDURE NOTE - PRE PROCEDURE EVALUATION
Attending Physician: Camron Boudreaux MD    Procedure: EGD    Indication for Procedure: abdominal pain  ________________________________________________________  PAST MEDICAL & SURGICAL HISTORY:  Arthritis      Spasmodic dysphonia      IBS (irritable bowel syndrome)      Hiatal hernia      Scoliosis      MVP (mitral valve prolapse)      GERD (gastroesophageal reflux disease)      H/O kyphosis      History of appendectomy  left; repair      H/O removal of cyst  left axilla      H/O unilateral salpingectomy  right      History of fracture of humerus      S/P laparoscopic cholecystectomy      S/P tonsillectomy        ALLERGIES:  shellfish (Diarrhea)  No Known Drug Allergies    HOME MEDICATIONS:  pantoprazole 40 mg oral delayed release tablet: 1 tab(s) orally once a day 2 more doses left    AICD/PPM: [ ] yes   [x ] no    PERTINENT LAB DATA:                      PHYSICAL EXAMINATION:    Height (cm): 149.9  Weight (kg): 36.3  BMI (kg/m2): 16.2  BSA (m2): 1.25T(C): --  HR: --  BP: --  RR: --  SpO2: --    Constitutional: NAD  HEENT: PERRLA, EOMI,    Neck:  No JVD  Respiratory: CTAB/L  Cardiovascular: S1 and S2  Gastrointestinal: BS+, soft, NT/ND  Extremities: No peripheral edema  Neurological: A/O x 3, no focal deficits  Psychiatric: Normal mood, normal affect  Skin: No rashes    ASA Class: I [ ]  II [ ]  III [x ]  IV [ ]    COMMENTS:    The patient is a suitable candidate for the planned procedure unless box checked [ ]  No, explain:

## 2025-06-03 NOTE — ASU DISCHARGE PLAN (ADULT/PEDIATRIC) - FINANCIAL ASSISTANCE
[facility] provides services at a reduced cost to those who are determined to be eligible through [facility]’s financial assistance program. Information regarding [facility]’s financial assistance program can be found by going to [email or web address] or by calling [phone].

## 2025-06-04 LAB — SURGICAL PATHOLOGY STUDY: SIGNIFICANT CHANGE UP

## 2025-09-03 ENCOUNTER — APPOINTMENT (OUTPATIENT)
Dept: ORTHOPEDIC SURGERY | Facility: CLINIC | Age: 80
End: 2025-09-03

## (undated) DEVICE — TUBING SUCTION 20FT

## (undated) DEVICE — SOL INJ NS 0.9% 500ML 2 PORT

## (undated) DEVICE — TUBING SUCTION CONN 6FT STERILE

## (undated) DEVICE — FOLEY HOLDER STATLOCK 2 WAY ADULT

## (undated) DEVICE — CATH IV SAFE BC 20G X 1.16" (PINK)

## (undated) DEVICE — SENSOR O2 FINGER ADULT

## (undated) DEVICE — BIOPSY FORCEP RADIAL JAW 4 STANDARD WITH NEEDLE

## (undated) DEVICE — SYR ALLIANCE II INFLATION 60ML

## (undated) DEVICE — BITE BLOCK ADULT 20 X 27MM (GREEN)

## (undated) DEVICE — SUCTION YANKAUER NO CONTROL VENT

## (undated) DEVICE — PACK IV START WITH CHG

## (undated) DEVICE — CATH IV SAFE BC 22G X 1" (BLUE)

## (undated) DEVICE — TUBING IV SET GRAVITY 3Y 100" MACRO

## (undated) DEVICE — BALLOON US ENDO